# Patient Record
Sex: FEMALE | Race: OTHER | HISPANIC OR LATINO | URBAN - METROPOLITAN AREA
[De-identification: names, ages, dates, MRNs, and addresses within clinical notes are randomized per-mention and may not be internally consistent; named-entity substitution may affect disease eponyms.]

---

## 2020-12-14 ENCOUNTER — INPATIENT (INPATIENT)
Facility: HOSPITAL | Age: 35
LOS: 3 days | Discharge: HOME | End: 2020-12-18
Attending: INTERNAL MEDICINE | Admitting: INTERNAL MEDICINE
Payer: SELF-PAY

## 2020-12-14 VITALS
OXYGEN SATURATION: 96 % | SYSTOLIC BLOOD PRESSURE: 115 MMHG | DIASTOLIC BLOOD PRESSURE: 113 MMHG | RESPIRATION RATE: 16 BRPM | HEART RATE: 93 BPM | TEMPERATURE: 98 F | HEIGHT: 63 IN

## 2020-12-14 PROCEDURE — 99285 EMERGENCY DEPT VISIT HI MDM: CPT

## 2020-12-14 NOTE — ED ADULT TRIAGE NOTE - CHIEF COMPLAINT QUOTE
Pt. brought to [facility] ED by LILLI. Pt. was getting out of bed this morning and felt pain in back. Pt. is unable to sit down. Lying prone on stretcher. Took muscle relaxer(does not know name) at around 1330 pm. NAD noted.

## 2020-12-15 DIAGNOSIS — M54.5 LOW BACK PAIN: ICD-10-CM

## 2020-12-15 DIAGNOSIS — Z29.9 ENCOUNTER FOR PROPHYLACTIC MEASURES, UNSPECIFIED: ICD-10-CM

## 2020-12-15 DIAGNOSIS — R52 PAIN, UNSPECIFIED: ICD-10-CM

## 2020-12-15 DIAGNOSIS — M54.42 LUMBAGO WITH SCIATICA, LEFT SIDE: ICD-10-CM

## 2020-12-15 DIAGNOSIS — Z90.79 ACQUIRED ABSENCE OF OTHER GENITAL ORGAN(S): Chronic | ICD-10-CM

## 2020-12-15 LAB
ALBUMIN SERPL ELPH-MCNC: 3.8 G/DL — SIGNIFICANT CHANGE UP (ref 3.3–5)
ALP SERPL-CCNC: 84 U/L — SIGNIFICANT CHANGE UP (ref 40–120)
ALT FLD-CCNC: 14 U/L — SIGNIFICANT CHANGE UP (ref 4–33)
ANION GAP SERPL CALC-SCNC: 9 MMOL/L — SIGNIFICANT CHANGE UP (ref 7–14)
AST SERPL-CCNC: 20 U/L — SIGNIFICANT CHANGE UP (ref 4–32)
BASOPHILS # BLD AUTO: 0.04 K/UL — SIGNIFICANT CHANGE UP (ref 0–0.2)
BASOPHILS NFR BLD AUTO: 0.5 % — SIGNIFICANT CHANGE UP (ref 0–2)
BILIRUB SERPL-MCNC: 0.3 MG/DL — SIGNIFICANT CHANGE UP (ref 0.2–1.2)
BUN SERPL-MCNC: 14 MG/DL — SIGNIFICANT CHANGE UP (ref 7–23)
CALCIUM SERPL-MCNC: 9 MG/DL — SIGNIFICANT CHANGE UP (ref 8.4–10.5)
CHLORIDE SERPL-SCNC: 102 MMOL/L — SIGNIFICANT CHANGE UP (ref 98–107)
CO2 SERPL-SCNC: 28 MMOL/L — SIGNIFICANT CHANGE UP (ref 22–31)
CREAT SERPL-MCNC: 0.76 MG/DL — SIGNIFICANT CHANGE UP (ref 0.5–1.3)
CRP SERPL-MCNC: <4 MG/L — SIGNIFICANT CHANGE UP
EOSINOPHIL # BLD AUTO: 0.19 K/UL — SIGNIFICANT CHANGE UP (ref 0–0.5)
EOSINOPHIL NFR BLD AUTO: 2.1 % — SIGNIFICANT CHANGE UP (ref 0–6)
ERYTHROCYTE [SEDIMENTATION RATE] IN BLOOD: 18 MM/HR — SIGNIFICANT CHANGE UP (ref 4–25)
GLUCOSE SERPL-MCNC: 95 MG/DL — SIGNIFICANT CHANGE UP (ref 70–99)
HCG SERPL-ACNC: <5 MIU/ML — SIGNIFICANT CHANGE UP
HCT VFR BLD CALC: 39.7 % — SIGNIFICANT CHANGE UP (ref 34.5–45)
HGB BLD-MCNC: 13 G/DL — SIGNIFICANT CHANGE UP (ref 11.5–15.5)
IANC: 4.67 K/UL — SIGNIFICANT CHANGE UP (ref 1.5–8.5)
IMM GRANULOCYTES NFR BLD AUTO: 0.2 % — SIGNIFICANT CHANGE UP (ref 0–1.5)
LYMPHOCYTES # BLD AUTO: 3.32 K/UL — HIGH (ref 1–3.3)
LYMPHOCYTES # BLD AUTO: 37.6 % — SIGNIFICANT CHANGE UP (ref 13–44)
MCHC RBC-ENTMCNC: 29.1 PG — SIGNIFICANT CHANGE UP (ref 27–34)
MCHC RBC-ENTMCNC: 32.7 GM/DL — SIGNIFICANT CHANGE UP (ref 32–36)
MCV RBC AUTO: 89 FL — SIGNIFICANT CHANGE UP (ref 80–100)
MONOCYTES # BLD AUTO: 0.6 K/UL — SIGNIFICANT CHANGE UP (ref 0–0.9)
MONOCYTES NFR BLD AUTO: 6.8 % — SIGNIFICANT CHANGE UP (ref 2–14)
NEUTROPHILS # BLD AUTO: 4.67 K/UL — SIGNIFICANT CHANGE UP (ref 1.8–7.4)
NEUTROPHILS NFR BLD AUTO: 52.8 % — SIGNIFICANT CHANGE UP (ref 43–77)
NRBC # BLD: 0 /100 WBCS — SIGNIFICANT CHANGE UP
NRBC # FLD: 0 K/UL — SIGNIFICANT CHANGE UP
PLATELET # BLD AUTO: 276 K/UL — SIGNIFICANT CHANGE UP (ref 150–400)
POTASSIUM SERPL-MCNC: 3.5 MMOL/L — SIGNIFICANT CHANGE UP (ref 3.5–5.3)
POTASSIUM SERPL-SCNC: 3.5 MMOL/L — SIGNIFICANT CHANGE UP (ref 3.5–5.3)
PROT SERPL-MCNC: 7.1 G/DL — SIGNIFICANT CHANGE UP (ref 6–8.3)
RBC # BLD: 4.46 M/UL — SIGNIFICANT CHANGE UP (ref 3.8–5.2)
RBC # FLD: 11.8 % — SIGNIFICANT CHANGE UP (ref 10.3–14.5)
SARS-COV-2 RNA SPEC QL NAA+PROBE: SIGNIFICANT CHANGE UP
SODIUM SERPL-SCNC: 139 MMOL/L — SIGNIFICANT CHANGE UP (ref 135–145)
WBC # BLD: 8.84 K/UL — SIGNIFICANT CHANGE UP (ref 3.8–10.5)
WBC # FLD AUTO: 8.84 K/UL — SIGNIFICANT CHANGE UP (ref 3.8–10.5)

## 2020-12-15 PROCEDURE — 99223 1ST HOSP IP/OBS HIGH 75: CPT

## 2020-12-15 RX ORDER — LIDOCAINE 4 G/100G
1 CREAM TOPICAL DAILY
Refills: 0 | Status: DISCONTINUED | OUTPATIENT
Start: 2020-12-15 | End: 2020-12-18

## 2020-12-15 RX ORDER — ACETAMINOPHEN 500 MG
975 TABLET ORAL EVERY 8 HOURS
Refills: 0 | Status: DISCONTINUED | OUTPATIENT
Start: 2020-12-15 | End: 2020-12-17

## 2020-12-15 RX ORDER — POLYETHYLENE GLYCOL 3350 17 G/17G
17 POWDER, FOR SOLUTION ORAL DAILY
Refills: 0 | Status: DISCONTINUED | OUTPATIENT
Start: 2020-12-15 | End: 2020-12-18

## 2020-12-15 RX ORDER — MORPHINE SULFATE 50 MG/1
4 CAPSULE, EXTENDED RELEASE ORAL ONCE
Refills: 0 | Status: DISCONTINUED | OUTPATIENT
Start: 2020-12-15 | End: 2020-12-15

## 2020-12-15 RX ORDER — TRAMADOL HYDROCHLORIDE 50 MG/1
50 TABLET ORAL EVERY 6 HOURS
Refills: 0 | Status: DISCONTINUED | OUTPATIENT
Start: 2020-12-15 | End: 2020-12-17

## 2020-12-15 RX ORDER — GABAPENTIN 400 MG/1
100 CAPSULE ORAL THREE TIMES A DAY
Refills: 0 | Status: DISCONTINUED | OUTPATIENT
Start: 2020-12-15 | End: 2020-12-17

## 2020-12-15 RX ORDER — DIAZEPAM 5 MG
5 TABLET ORAL ONCE
Refills: 0 | Status: DISCONTINUED | OUTPATIENT
Start: 2020-12-15 | End: 2020-12-15

## 2020-12-15 RX ORDER — KETOROLAC TROMETHAMINE 30 MG/ML
15 SYRINGE (ML) INJECTION ONCE
Refills: 0 | Status: DISCONTINUED | OUTPATIENT
Start: 2020-12-15 | End: 2020-12-15

## 2020-12-15 RX ORDER — ACETAMINOPHEN 500 MG
975 TABLET ORAL ONCE
Refills: 0 | Status: COMPLETED | OUTPATIENT
Start: 2020-12-15 | End: 2020-12-15

## 2020-12-15 RX ORDER — TRAMADOL HYDROCHLORIDE 50 MG/1
25 TABLET ORAL EVERY 6 HOURS
Refills: 0 | Status: DISCONTINUED | OUTPATIENT
Start: 2020-12-15 | End: 2020-12-17

## 2020-12-15 RX ORDER — MORPHINE SULFATE 50 MG/1
4 CAPSULE, EXTENDED RELEASE ORAL EVERY 4 HOURS
Refills: 0 | Status: DISCONTINUED | OUTPATIENT
Start: 2020-12-15 | End: 2020-12-17

## 2020-12-15 RX ORDER — LIDOCAINE 4 G/100G
1 CREAM TOPICAL ONCE
Refills: 0 | Status: COMPLETED | OUTPATIENT
Start: 2020-12-15 | End: 2020-12-15

## 2020-12-15 RX ORDER — IBUPROFEN 200 MG
600 TABLET ORAL EVERY 6 HOURS
Refills: 0 | Status: DISCONTINUED | OUTPATIENT
Start: 2020-12-15 | End: 2020-12-17

## 2020-12-15 RX ADMIN — Medication 5 MILLIGRAM(S): at 01:48

## 2020-12-15 RX ADMIN — Medication 975 MILLIGRAM(S): at 14:05

## 2020-12-15 RX ADMIN — Medication 600 MILLIGRAM(S): at 18:03

## 2020-12-15 RX ADMIN — MORPHINE SULFATE 4 MILLIGRAM(S): 50 CAPSULE, EXTENDED RELEASE ORAL at 00:44

## 2020-12-15 RX ADMIN — LIDOCAINE 1 PATCH: 4 CREAM TOPICAL at 23:21

## 2020-12-15 RX ADMIN — LIDOCAINE 1 PATCH: 4 CREAM TOPICAL at 05:50

## 2020-12-15 RX ADMIN — LIDOCAINE 1 PATCH: 4 CREAM TOPICAL at 05:45

## 2020-12-15 RX ADMIN — LIDOCAINE 1 PATCH: 4 CREAM TOPICAL at 11:12

## 2020-12-15 RX ADMIN — LIDOCAINE 1 PATCH: 4 CREAM TOPICAL at 11:17

## 2020-12-15 RX ADMIN — GABAPENTIN 100 MILLIGRAM(S): 400 CAPSULE ORAL at 21:27

## 2020-12-15 RX ADMIN — LIDOCAINE 1 PATCH: 4 CREAM TOPICAL at 00:44

## 2020-12-15 RX ADMIN — Medication 600 MILLIGRAM(S): at 11:57

## 2020-12-15 RX ADMIN — MORPHINE SULFATE 4 MILLIGRAM(S): 50 CAPSULE, EXTENDED RELEASE ORAL at 18:51

## 2020-12-15 RX ADMIN — Medication 975 MILLIGRAM(S): at 15:00

## 2020-12-15 RX ADMIN — Medication 600 MILLIGRAM(S): at 18:33

## 2020-12-15 RX ADMIN — MORPHINE SULFATE 4 MILLIGRAM(S): 50 CAPSULE, EXTENDED RELEASE ORAL at 05:46

## 2020-12-15 RX ADMIN — LIDOCAINE 1 PATCH: 4 CREAM TOPICAL at 18:55

## 2020-12-15 RX ADMIN — MORPHINE SULFATE 4 MILLIGRAM(S): 50 CAPSULE, EXTENDED RELEASE ORAL at 05:35

## 2020-12-15 RX ADMIN — Medication 600 MILLIGRAM(S): at 23:21

## 2020-12-15 RX ADMIN — GABAPENTIN 100 MILLIGRAM(S): 400 CAPSULE ORAL at 14:05

## 2020-12-15 RX ADMIN — Medication 975 MILLIGRAM(S): at 00:43

## 2020-12-15 RX ADMIN — Medication 975 MILLIGRAM(S): at 21:27

## 2020-12-15 RX ADMIN — Medication 15 MILLIGRAM(S): at 01:48

## 2020-12-15 RX ADMIN — Medication 600 MILLIGRAM(S): at 11:11

## 2020-12-15 NOTE — H&P ADULT - NSHPSOCIALHISTORY_GEN_ALL_CORE
Lives at home with  and children  Works remotely for customer services for [employer]  Denies smoking, alcohol use or illicit drugs/IVDU

## 2020-12-15 NOTE — ED PROVIDER NOTE - ATTENDING CONTRIBUTION TO CARE
I have seen and examined the patient on the patient´s visit date. I have reviewed the note written by Otis Camacho DO on that visit day. I have supervised and participated as necessary in the performance of procedures indicated for patient management and was available at all phases of the patient´s visit when needed. We discussed the history, physical exam findings, management plan, and  medical decision making. I have made my additions, exceptions, and revisions within the chart and I agree with H and P as documented in its entirety. The data and my interpretation of any data collected from labs, interventions and imaging appear below as well as my independent medical decision making and considerations    The patient is a 35y Female who has a past medical and surgery history of Ectopic pregnancy PTED with right lower back pain that has gotten worse over the past week and unresponsive to conservative measures   Vital Signs Last 24 Hrs  T(C): 36.9 (14 Dec 2020 22:23), Max: 36.9 (14 Dec 2020 22:23)  T(F): 98.4 (14 Dec 2020 22:23), Max: 98.4 (14 Dec 2020 22:23)  HR: 93 (14 Dec 2020 22:23) (93 - 93)  BP: 115/113 (14 Dec 2020 22:23) (115/113 - 115/113)  BP(mean): --  RR: 16 (14 Dec 2020 22:23) (16 - 16)  SpO2: 96% (14 Dec 2020 22:23) (96% - 96%)Height (cm): 160 (12-14-20 @ 22:23)  PE: as described; my additions and exceptions are noted in the chart  IMP: MSK back pain  Plan  analgesics=toradol/morphine; lidocaine patch reassess I have seen and examined the patient on the patient´s visit date. I have reviewed the note written by Otis Camacho DO on that visit day. I have supervised and participated as necessary in the performance of procedures indicated for patient management and was available at all phases of the patient´s visit when needed. We discussed the history, physical exam findings, management plan, and  medical decision making. I have made my additions, exceptions, and revisions within the chart and I agree with H and P as documented in its entirety. The data and my interpretation of any data collected from labs, interventions and imaging appear below as well as my independent medical decision making and considerations    The patient is a 35y Female who has a past medical and surgery history of Ectopic pregnancy PTED with right lower back pain that has gotten worse over the past week and unresponsive to conservative measures   Vital Signs Last 24 Hrs  T(C): 36.9 (14 Dec 2020 22:23), Max: 36.9 (14 Dec 2020 22:23)  T(F): 98.4 (14 Dec 2020 22:23), Max: 98.4 (14 Dec 2020 22:23)  HR: 93 (14 Dec 2020 22:23) (93 - 93)  BP: 115/113 (14 Dec 2020 22:23) (115/113 - 115/113)  BP(mean): --  RR: 16 (14 Dec 2020 22:23) (16 - 16)  SpO2: 96% (14 Dec 2020 22:23) (96% - 96%)Height (cm): 160 (12-14-20 @ 22:23)  PE: as described; my additions and exceptions are noted in the chart  IMP: MSK back pain  Plan  analgesics=toradol/morphine; lidocaine patch   Reassess  Dispo and further workup predicated on response

## 2020-12-15 NOTE — ED PROVIDER NOTE - PHYSICAL EXAMINATION
General: alert, conversant, looks well/non toxic, vitals reassuring, appears uncomfortable. lying flat   Head: atraumatic, normocephalic  Eyes: PERRL, EOMI, no scleral icterus  ENT: no epistaxis, moist mucous membranes, normal phonation, airway patent  Neck: full ROM, no midline ttp  CV: RRR, no murmurs, HDS  Pulm: lungs CTA b/l, no wheezing, no respiratory distress  GI: abd soft, non tender, no guarding/rebound/masses  Back: limited ROM 2/2 to pain, comfortable lying supine, - SLR b/l, no ecchymosis, no midline ttp, tenderness to paraspinal R lumbar  Extremities: normal ROM, joints stable, distal pulses intact, no edema  Neuro: alert, oriented x3, moving all extremities, interactive, normal speech/memory, 5/5 strength in extremities, no sensory deficits   Derm: warm, dry, normal color, no rash/wounds

## 2020-12-15 NOTE — H&P ADULT - MOTOR
Unable to assess strength in LLE due to pain and spasms, able to wiggle toes; 5/5 strenght in upper extremities

## 2020-12-15 NOTE — ED ADULT NURSE NOTE - OBJECTIVE STATEMENT
Pt is a 35yr old female, A&OX4, complaining of lower back pain that started this morning. Pt reports shes unable to walk or sit up. Currently side lying on the stretcher. Pt denies numbness and tingling, trauma, or falls. States she got out of bed and thinks she pulled something. Pt took muscle relaxer at home (unsure of name) with no relief. Resp. even and unlabored. Denies CP, SOB, HA, dizziness, abd. pain, N/V, fever/chills, cough, and urinary symptoms. VS as noted. 20g IV placed to the L AC. Labs sent. Meds given as per order. NAD. Will continue to monitor.

## 2020-12-15 NOTE — ED PROVIDER NOTE - CARE PLAN
Principal Discharge DX:	Acute right-sided low back pain without sciatica   Principal Discharge DX:	Acute right-sided low back pain without sciatica  Secondary Diagnosis:	Intractable back pain

## 2020-12-15 NOTE — H&P ADULT - PROBLEM SELECTOR PLAN 2
Intractable back pain impairing ambulation, likely MSK etiology  - start standing tylenol  - gabapentin 100 mg TID  - ibuprfen 600 mg q6 hrs  - tramadol 25 mg q6 for moderate and tramadol 50 mg q6 for severe  - morphine 4 mg q4 hrs breakthrough

## 2020-12-15 NOTE — H&P ADULT - PROBLEM SELECTOR PLAN 1
Patient with acute onset back with sciatica like symptoms to the left but otherwise no focal neuro deficits.  - pain control as below  - PT evaluation  - MR T+L spine

## 2020-12-15 NOTE — ED PROVIDER NOTE - OBJECTIVE STATEMENT
35 yoF, healthy BIB EMS for worsening low back pain, worse on R. States has been having pain for 1 week and has been using roller, heat. Was actually trying to make appt with physician today. Became acutely worse this am after moving from lying flat. Now pain so severe unable to walk. Sx worse since lunch time today (Monday). Tried tylenol and then tramadol/muscle relaxer from neighbor with no relief. Unable to walk due to pain. Denies any bowel/urinary sx, fever/IVDA, weakness in BLE, or sensory changes. Denies any urinary sx or abd sx. No recent illness. Never had back pain this severe before. No prior back surgeries. Denies being pregnant. No smoking or etoh use.

## 2020-12-15 NOTE — H&P ADULT - HISTORY OF PRESENT ILLNESS
35F no PMH presenting with acute back pain over the last week. Patient endorses she has been working from home and largely sedentary. Last week patient endorses gradual onset low back pain, which had been treating conservatively with hot packs. One day prior to admission, patient endorses severe worsening of back pain while trying to get out of bed. The pain was so severe she was unable to ambulate to the bathroom. Endorses pain radiates from the midback down the LLE assoaciated with some numbness and tingling. Denies any fevers, chills, headaches, difficulty urinating/defecating.      Here in ER, afebrile VS wnl. Received morphine 4 mg x 2, lidoderm patch, toradol 15 mg x 1, diazepam 5 mg x 1, and tylenol 975 with mininal relief. Admitted for pain control.

## 2020-12-15 NOTE — H&P ADULT - ASSESSMENT
35F no PMH presenting with acute back pain over the last week with significant pain impairing ambulation but no focal deficits, concerning for muscular strain verses disc hernaition

## 2020-12-15 NOTE — ED PROVIDER NOTE - CLINICAL SUMMARY MEDICAL DECISION MAKING FREE TEXT BOX
Doug, PGY3- 35 yoF, healthy BIB EMS for worsening low back pain, worse on R. States has been having pain for 1 week and has been using roller, heat. Was actually trying to make appt with physician today. Became acutely worse this am after moving from lying flat. Now pain so severe unable to walk. Sx worse since lunch time today (Monday). Tried tylenol and then tramadol/muscle relaxer from neighbor with no relief. Unable to walk due to pain. Denies any bowel/urinary sx, fever/IVDA, weakness in BLE, or sensory changes. Denies any urinary sx or abd sx. No recent illness. Never had back pain this severe before. No prior back surgeries. Denies being pregnant. No smoking or etoh use.  looks uncomfortable but non toxic, vitals reassuring, normal neuro exam, abd soft, no midline ttp on vertebral exam, able to roll over on own power  likely msk strain/spasm, no red flags for central cord pathology, acute worsening of sub acute issue, not consistent with /abd/pelvic/cardiac/aortic pathology  parenteral analgesia given inability to ambulate 2/2 to pain, no imaging indicated   re-eval after meds

## 2020-12-15 NOTE — H&P ADULT - NSHPPHYSICALEXAM_GEN_ALL_CORE
Vital Signs Last 24 Hrs  T(C): 36.6 (15 Dec 2020 08:52), Max: 36.9 (14 Dec 2020 22:23)  T(F): 97.9 (15 Dec 2020 08:52), Max: 98.4 (14 Dec 2020 22:23)  HR: 77 (15 Dec 2020 08:52) (77 - 93)  BP: 126/74 (15 Dec 2020 08:52) (115/113 - 136/89)  BP(mean): --  RR: 18 (15 Dec 2020 08:52) (16 - 18)  SpO2: 99% (15 Dec 2020 08:52) (96% - 100%)

## 2020-12-15 NOTE — ED PROVIDER NOTE - PROGRESS NOTE DETAILS
Doug, PGY3- Sleeping in ED after medications. Upon awakening for re-eval: pain improved but still very uncomfortable with trying to roll or stand. Not able to stand even with assistance. Re-examined: no midline lumbar ttp. 5/5 strength with plantarflexion/dorsiflexion. No sensory deficits in lower extremities/saddle region.  Will rpt morphine and admit for intractable back pain. Dr. Boone accepting.

## 2020-12-16 LAB
ANION GAP SERPL CALC-SCNC: 10 MMOL/L — SIGNIFICANT CHANGE UP (ref 7–14)
BASOPHILS # BLD AUTO: 0.02 K/UL — SIGNIFICANT CHANGE UP (ref 0–0.2)
BASOPHILS NFR BLD AUTO: 0.3 % — SIGNIFICANT CHANGE UP (ref 0–2)
BUN SERPL-MCNC: 12 MG/DL — SIGNIFICANT CHANGE UP (ref 7–23)
CALCIUM SERPL-MCNC: 8.9 MG/DL — SIGNIFICANT CHANGE UP (ref 8.4–10.5)
CHLORIDE SERPL-SCNC: 104 MMOL/L — SIGNIFICANT CHANGE UP (ref 98–107)
CO2 SERPL-SCNC: 24 MMOL/L — SIGNIFICANT CHANGE UP (ref 22–31)
CREAT SERPL-MCNC: 0.63 MG/DL — SIGNIFICANT CHANGE UP (ref 0.5–1.3)
EOSINOPHIL # BLD AUTO: 0.21 K/UL — SIGNIFICANT CHANGE UP (ref 0–0.5)
EOSINOPHIL NFR BLD AUTO: 3.1 % — SIGNIFICANT CHANGE UP (ref 0–6)
GLUCOSE SERPL-MCNC: 84 MG/DL — SIGNIFICANT CHANGE UP (ref 70–99)
HCT VFR BLD CALC: 41.7 % — SIGNIFICANT CHANGE UP (ref 34.5–45)
HGB BLD-MCNC: 13.7 G/DL — SIGNIFICANT CHANGE UP (ref 11.5–15.5)
IANC: 3.87 K/UL — SIGNIFICANT CHANGE UP (ref 1.5–8.5)
IMM GRANULOCYTES NFR BLD AUTO: 0.6 % — SIGNIFICANT CHANGE UP (ref 0–1.5)
LYMPHOCYTES # BLD AUTO: 2.18 K/UL — SIGNIFICANT CHANGE UP (ref 1–3.3)
LYMPHOCYTES # BLD AUTO: 32.5 % — SIGNIFICANT CHANGE UP (ref 13–44)
MCHC RBC-ENTMCNC: 29.5 PG — SIGNIFICANT CHANGE UP (ref 27–34)
MCHC RBC-ENTMCNC: 32.9 GM/DL — SIGNIFICANT CHANGE UP (ref 32–36)
MCV RBC AUTO: 89.7 FL — SIGNIFICANT CHANGE UP (ref 80–100)
MONOCYTES # BLD AUTO: 0.39 K/UL — SIGNIFICANT CHANGE UP (ref 0–0.9)
MONOCYTES NFR BLD AUTO: 5.8 % — SIGNIFICANT CHANGE UP (ref 2–14)
NEUTROPHILS # BLD AUTO: 3.87 K/UL — SIGNIFICANT CHANGE UP (ref 1.8–7.4)
NEUTROPHILS NFR BLD AUTO: 57.7 % — SIGNIFICANT CHANGE UP (ref 43–77)
NRBC # BLD: 0 /100 WBCS — SIGNIFICANT CHANGE UP
NRBC # FLD: 0 K/UL — SIGNIFICANT CHANGE UP
PLATELET # BLD AUTO: 235 K/UL — SIGNIFICANT CHANGE UP (ref 150–400)
POTASSIUM SERPL-MCNC: 3.9 MMOL/L — SIGNIFICANT CHANGE UP (ref 3.5–5.3)
POTASSIUM SERPL-SCNC: 3.9 MMOL/L — SIGNIFICANT CHANGE UP (ref 3.5–5.3)
RBC # BLD: 4.65 M/UL — SIGNIFICANT CHANGE UP (ref 3.8–5.2)
RBC # FLD: 11.8 % — SIGNIFICANT CHANGE UP (ref 10.3–14.5)
SODIUM SERPL-SCNC: 138 MMOL/L — SIGNIFICANT CHANGE UP (ref 135–145)
WBC # BLD: 6.71 K/UL — SIGNIFICANT CHANGE UP (ref 3.8–10.5)
WBC # FLD AUTO: 6.71 K/UL — SIGNIFICANT CHANGE UP (ref 3.8–10.5)

## 2020-12-16 PROCEDURE — 72148 MRI LUMBAR SPINE W/O DYE: CPT | Mod: 26

## 2020-12-16 PROCEDURE — 72146 MRI CHEST SPINE W/O DYE: CPT | Mod: 26

## 2020-12-16 RX ORDER — INFLUENZA VIRUS VACCINE 15; 15; 15; 15 UG/.5ML; UG/.5ML; UG/.5ML; UG/.5ML
0.5 SUSPENSION INTRAMUSCULAR ONCE
Refills: 0 | Status: COMPLETED | OUTPATIENT
Start: 2020-12-16 | End: 2020-12-16

## 2020-12-16 RX ADMIN — TRAMADOL HYDROCHLORIDE 50 MILLIGRAM(S): 50 TABLET ORAL at 14:00

## 2020-12-16 RX ADMIN — Medication 600 MILLIGRAM(S): at 06:12

## 2020-12-16 RX ADMIN — Medication 600 MILLIGRAM(S): at 17:11

## 2020-12-16 RX ADMIN — TRAMADOL HYDROCHLORIDE 50 MILLIGRAM(S): 50 TABLET ORAL at 13:12

## 2020-12-16 RX ADMIN — TRAMADOL HYDROCHLORIDE 50 MILLIGRAM(S): 50 TABLET ORAL at 23:39

## 2020-12-16 RX ADMIN — Medication 975 MILLIGRAM(S): at 22:15

## 2020-12-16 RX ADMIN — LIDOCAINE 1 PATCH: 4 CREAM TOPICAL at 18:41

## 2020-12-16 RX ADMIN — LIDOCAINE 1 PATCH: 4 CREAM TOPICAL at 12:05

## 2020-12-16 RX ADMIN — MORPHINE SULFATE 4 MILLIGRAM(S): 50 CAPSULE, EXTENDED RELEASE ORAL at 19:05

## 2020-12-16 RX ADMIN — MORPHINE SULFATE 4 MILLIGRAM(S): 50 CAPSULE, EXTENDED RELEASE ORAL at 09:05

## 2020-12-16 RX ADMIN — Medication 975 MILLIGRAM(S): at 06:12

## 2020-12-16 RX ADMIN — GABAPENTIN 100 MILLIGRAM(S): 400 CAPSULE ORAL at 06:12

## 2020-12-16 RX ADMIN — MORPHINE SULFATE 4 MILLIGRAM(S): 50 CAPSULE, EXTENDED RELEASE ORAL at 18:51

## 2020-12-16 RX ADMIN — GABAPENTIN 100 MILLIGRAM(S): 400 CAPSULE ORAL at 12:04

## 2020-12-16 RX ADMIN — GABAPENTIN 100 MILLIGRAM(S): 400 CAPSULE ORAL at 22:15

## 2020-12-16 RX ADMIN — Medication 600 MILLIGRAM(S): at 12:04

## 2020-12-16 NOTE — PHYSICAL THERAPY INITIAL EVALUATION ADULT - PATIENT PROFILE REVIEW, REHAB EVAL
PT orders received: ambulate with assistance. Consult with CHANTAL Sierra, pt may participate in PT evaluation.

## 2020-12-16 NOTE — PHYSICAL THERAPY INITIAL EVALUATION ADULT - ASSISTIVE DEVICE FOR TRANSFER: GAIT, REHAB EVAL
Without rolling walker, pt reports being prepared to ambulate back to bed holding onto nearby furniture as support./rolling walker rolling walker

## 2020-12-16 NOTE — PHYSICAL THERAPY INITIAL EVALUATION ADULT - ADDITIONAL COMMENTS
Patient lived in a private house with family, requiring 4 steps to negotiate. Per pt, there are 8 steps required to reach her bedroom. Patient was ambulating independently without an assistive device. Prior to admission, patient was independent across all transfer and ADL skills.    Patient was left semi-supine in bed,. Call bell within reach. CHANTAL iqbal.

## 2020-12-16 NOTE — PHYSICAL THERAPY INITIAL EVALUATION ADULT - GENERAL OBSERVATIONS, REHAB EVAL
Patient was received semi-supine in patient transport stretcher, with 9/10 pain in back, but was willing to participate in PT session. CHANTAL iqbal.

## 2020-12-16 NOTE — PHYSICAL THERAPY INITIAL EVALUATION ADULT - PERTINENT HX OF CURRENT PROBLEM, REHAB EVAL
Pt is a 34 yo female p/w acute back pain x1 week. Pt has been working from home and largely sedentary. Initially, low back pain pain has been gradually increasing, until one day prior to admission, patient endorses severe worsening of back pain while trying to get out of bed. Subsequently, pt was unable to ambulate to the bathroom. Pt reports pain radiates from the midback down the LLE assoaciated with some numbness and tingling. PMH: insignificant

## 2020-12-17 ENCOUNTER — TRANSCRIPTION ENCOUNTER (OUTPATIENT)
Age: 35
End: 2020-12-17

## 2020-12-17 PROCEDURE — 99255 IP/OBS CONSLTJ NEW/EST HI 80: CPT

## 2020-12-17 RX ORDER — ACETAMINOPHEN 500 MG
975 TABLET ORAL EVERY 8 HOURS
Refills: 0 | Status: DISCONTINUED | OUTPATIENT
Start: 2020-12-17 | End: 2020-12-18

## 2020-12-17 RX ORDER — TIZANIDINE 4 MG/1
2 TABLET ORAL EVERY 6 HOURS
Refills: 0 | Status: DISCONTINUED | OUTPATIENT
Start: 2020-12-17 | End: 2020-12-18

## 2020-12-17 RX ORDER — IBUPROFEN 200 MG
600 TABLET ORAL EVERY 6 HOURS
Refills: 0 | Status: DISCONTINUED | OUTPATIENT
Start: 2020-12-17 | End: 2020-12-18

## 2020-12-17 RX ORDER — OXYCODONE HYDROCHLORIDE 5 MG/1
5 TABLET ORAL EVERY 4 HOURS
Refills: 0 | Status: DISCONTINUED | OUTPATIENT
Start: 2020-12-17 | End: 2020-12-18

## 2020-12-17 RX ORDER — OXYCODONE HYDROCHLORIDE 5 MG/1
10 TABLET ORAL EVERY 4 HOURS
Refills: 0 | Status: DISCONTINUED | OUTPATIENT
Start: 2020-12-17 | End: 2020-12-18

## 2020-12-17 RX ORDER — GABAPENTIN 400 MG/1
300 CAPSULE ORAL THREE TIMES A DAY
Refills: 0 | Status: DISCONTINUED | OUTPATIENT
Start: 2020-12-17 | End: 2020-12-18

## 2020-12-17 RX ADMIN — OXYCODONE HYDROCHLORIDE 10 MILLIGRAM(S): 5 TABLET ORAL at 12:35

## 2020-12-17 RX ADMIN — Medication 600 MILLIGRAM(S): at 12:35

## 2020-12-17 RX ADMIN — GABAPENTIN 100 MILLIGRAM(S): 400 CAPSULE ORAL at 05:36

## 2020-12-17 RX ADMIN — TRAMADOL HYDROCHLORIDE 50 MILLIGRAM(S): 50 TABLET ORAL at 00:15

## 2020-12-17 RX ADMIN — GABAPENTIN 300 MILLIGRAM(S): 400 CAPSULE ORAL at 15:08

## 2020-12-17 RX ADMIN — Medication 975 MILLIGRAM(S): at 05:37

## 2020-12-17 RX ADMIN — Medication 40 MILLIGRAM(S): at 17:29

## 2020-12-17 RX ADMIN — LIDOCAINE 1 PATCH: 4 CREAM TOPICAL at 17:30

## 2020-12-17 RX ADMIN — Medication 600 MILLIGRAM(S): at 13:15

## 2020-12-17 RX ADMIN — Medication 975 MILLIGRAM(S): at 15:08

## 2020-12-17 RX ADMIN — GABAPENTIN 300 MILLIGRAM(S): 400 CAPSULE ORAL at 21:28

## 2020-12-17 RX ADMIN — LIDOCAINE 1 PATCH: 4 CREAM TOPICAL at 12:35

## 2020-12-17 RX ADMIN — OXYCODONE HYDROCHLORIDE 10 MILLIGRAM(S): 5 TABLET ORAL at 13:15

## 2020-12-17 RX ADMIN — Medication 600 MILLIGRAM(S): at 05:36

## 2020-12-17 RX ADMIN — Medication 975 MILLIGRAM(S): at 21:28

## 2020-12-17 RX ADMIN — Medication 600 MILLIGRAM(S): at 17:29

## 2020-12-17 RX ADMIN — Medication 600 MILLIGRAM(S): at 21:28

## 2020-12-17 NOTE — CONSULT NOTE ADULT - SUBJECTIVE AND OBJECTIVE BOX
NEUROSURGERY CONSULT    Neurosurgery consulted for findings of herniated discs in thoracic and lumbar spine. Patient has significant pain in back and LLE that causes her discomfort. MRI shows T7-8, L5-S1 HNP. Patient states she feels slight relief from her current pain regimen.      HPI: 35F no PMH presenting with acute back pain over the last week. Patient endorses she has been working from home and largely sedentary. Last week patient endorses gradual onset low back pain, which had been treating conservatively with hot packs. One day prior to admission, patient endorses severe worsening of back pain while trying to get out of bed. The pain was so severe she was unable to ambulate to the bathroom. Endorses pain radiates from the midback down the LLE assoaciated with some numbness and tingling. Denies any fevers, chills, headaches, difficulty urinating/defecating.   (15 Dec 2020 09:43)    RADIOLOGY:   < from: MR Thoracic Spine No Cont (12.16.20 @ 11:33) >    IMPRESSION:    Maintenance of the usual thoracic and lumbar curvature without fracture or listhesis.    At T7-T8, there is a central disc extrusion with mild to moderate narrowing with abutment of the ventral aspect of the cord.    At L5-S1, there is a right central disc protrusion with mild central canal narrowing.      MEDS:  acetaminophen   Tablet .. 975 milliGRAM(s) Oral every 8 hours  gabapentin 300 milliGRAM(s) Oral three times a day  ibuprofen  Tablet. 600 milliGRAM(s) Oral every 6 hours  influenza   Vaccine 0.5 milliLiter(s) IntraMuscular once  lidocaine   Patch 1 Patch Transdermal daily  oxyCODONE    IR 5 milliGRAM(s) Oral every 4 hours PRN  oxyCODONE    IR 10 milliGRAM(s) Oral every 4 hours PRN  polyethylene glycol 3350 17 Gram(s) Oral daily      PHYSICAL EXAM:  Vital Signs Last 24 Hrs  T(C): 36.6 (17 Dec 2020 15:15), Max: 36.8 (16 Dec 2020 22:05)  T(F): 97.9 (17 Dec 2020 15:15), Max: 98.2 (16 Dec 2020 22:05)  HR: 83 (17 Dec 2020 15:15) (83 - 96)  BP: 144/88 (17 Dec 2020 15:15) (127/85 - 144/88)  RR: 17 (17 Dec 2020 15:15) (16 - 18)  SpO2: 100% (17 Dec 2020 15:15) (100% - 100%)    Examined by MARTINEZ dEwards     AOx3, appropriate, follows commands  PERRL, EOMI, face symmetrical   DEE x 4 with good strength - 4+/5 throughout lowers 2/2 pain   Sensation intact to light touch   No pronator drift   No clonus  No hoffmans    LABS:                        13.7   6.71  )-----------( 235      ( 16 Dec 2020 07:58 )             41.7     12-16    138  |  104  |  12  ----------------------------<  84  3.9   |  24  |  0.63    Ca    8.9      16 Dec 2020 07:58                
Chief Complaint:    HPI:  35F no PMH presenting with acute back pain over the last week. Patient endorses she has been working from home and largely sedentary. Last week patient endorses gradual onset low back pain, which had been treating conservatively with hot packs. One day prior to admission, patient endorses severe worsening of back pain while trying to get out of bed. The pain was so severe she was unable to ambulate to the bathroom. Endorses pain radiates from the midback down the LLE assoaciated with some numbness and tingling. Denies any fevers, chills, headaches, difficulty urinating/defecating.      Here in ER, afebrile VS wnl. Received morphine 4 mg x 2, lidoderm patch, toradol 15 mg x 1, diazepam 5 mg x 1, and tylenol 975 with mininal relief. Admitted for pain control.        (15 Dec 2020 09:43)      PAST MEDICAL & SURGICAL HISTORY:  Ectopic pregnancy    History of unilateral fallopian tube excision        FAMILY HISTORY:  Family history of hypertension  mother        SOCIAL HISTORY:  [ ] Denies Smoking, Alcohol, or Drug Use    Allergies    No Known Allergies    Intolerances        PAIN MEDICATIONS:  acetaminophen   Tablet .. 975 milliGRAM(s) Oral every 8 hours  gabapentin 300 milliGRAM(s) Oral three times a day  ibuprofen  Tablet. 600 milliGRAM(s) Oral every 6 hours  oxyCODONE    IR 5 milliGRAM(s) Oral every 4 hours PRN  oxyCODONE    IR 10 milliGRAM(s) Oral every 4 hours PRN      Heme:    Antibiotics:    Cardiovascular:    GI:  polyethylene glycol 3350 17 Gram(s) Oral daily    Endocrine:    All Other Medications:  influenza   Vaccine 0.5 milliLiter(s) IntraMuscular once  lidocaine   Patch 1 Patch Transdermal daily      REVIEW OF SYSTEMS:    CONSTITUTIONAL: No fever, weight loss, or fatigue  EYES: No eye pain, visual disturbances, or discharge  ENMT:  No difficulty hearing, tinnitus, vertigo; No sinus or throat pain  NECK: No pain or stiffness  BREASTS: No pain, masses, or nipple discharge  RESPIRATORY: No cough, wheezing, chills or hemoptysis; No shortness of breath  CARDIOVASCULAR: No chest pain, palpitations, dizziness, or leg swelling  GASTROINTESTINAL: No abdominal or epigastric pain. No nausea, vomiting, or hematemesis; No diarrhea or constipation. No melena or hematochezia.  GENITOURINARY: No dysuria, frequency, hematuria, or incontinence  NEUROLOGICAL: No headaches, memory loss, loss of strength, numbness, or tremors  SKIN: No itching, burning, rashes, or lesions   LYMPH NODES: No enlarged glands  ENDOCRINE: No heat or cold intolerance; No hair loss  MUSCULOSKELETAL: No joint pain or swelling; No muscle, back, or extremity pain  PSYCHIATRIC: No depression, anxiety, mood swings, or difficulty sleeping  HEME/LYMPH: No easy bruising, or bleeding gums  ALLERY AND IMMUNOLOGIC: No hives or eczema      Vital Signs Last 24 Hrs  T(C): 36.6 (17 Dec 2020 15:15), Max: 36.8 (16 Dec 2020 22:05)  T(F): 97.9 (17 Dec 2020 15:15), Max: 98.2 (16 Dec 2020 22:05)  HR: 83 (17 Dec 2020 15:15) (83 - 96)  BP: 144/88 (17 Dec 2020 15:15) (127/85 - 144/88)  BP(mean): --  RR: 17 (17 Dec 2020 15:15) (16 - 18)  SpO2: 100% (17 Dec 2020 15:15) (100% - 100%)    PAIN SCORE:         SCALE USED: (1-10 VNRS)             PHYSICAL EXAM:    GENERAL: NAD, well-groomed, well-developed  HEAD:  Atraumatic, Normocephalic  EYES: EOMI, PERRLA, conjunctiva and sclera clear  ENMT: No tonsillar erythema, exudates, or enlargement; Moist mucous membranes, Good dentition, No lesions  NECK: Supple, No JVD, Normal thyroid  NERVOUS SYSTEM:  Alert & Oriented X3, Good concentration; Motor Strength 5/5 B/L upper and lower extremities; DTRs 2+ intact and symmetric  CHEST/LUNG: Clear to percussion bilaterally; No rales, rhonchi, wheezing, or rubs  HEART: Regular rate and rhythm; No murmurs, rubs, or gallops  ABDOMEN: Soft, Nontender, Nondistended; Bowel sounds present  EXTREMITIES:  2+ Peripheral Pulses, No clubbing, cyanosis, or edema  LYMPH: No lymphadenopathy noted  SKIN: No rashes or lesions        LABS:                          13.7   6.71  )-----------( 235      ( 16 Dec 2020 07:58 )             41.7     12-16    138  |  104  |  12  ----------------------------<  84  3.9   |  24  |  0.63    Ca    8.9      16 Dec 2020 07:58      Comments: Pt. complaining of pain in lower back 7/10, describes it as a contraction in her lower back. States the morphine Iv took the edge off but doesn't think anything else is helping with her pain. Patient A&Ox3, NAD, laying in bed answers all questions appropriately.     RECOMMENDATIONS  1. Add oxycodone 5mg for moderate pain and 10mg for severe pain q4hrs PRN  2. Increase gabapentin to 300mg q8hrs   3. Continue Tylenol and Nsaid as ordered   4. Consider Tizanidine 2mg q6hrs PRN for muscle spasm   5. Add lidoderm patch to lower back   6. Have patient follow up with chronic pain MD as outpatient     Will discuss with chronic pain attending      [X ]  NIKO  Reviewed and Copied to Chart

## 2020-12-17 NOTE — PROGRESS NOTE ADULT - ASSESSMENT
35F no PMH presenting with acute back pain over the last week with significant pain impairing ambulation but no focal deficits, concerning for muscular strain verses disc hernaition       Problem/Plan - 1:  ·  Problem: Acute back pain with sciatica, left.  Plan: Patient with acute onset back with sciatica like symptoms to the left but otherwise no focal neuro deficits.  - pain control as below  - PT evaluation  -  MR Thoracic Spine No Cont (12.16.20 @ 11:33) >  IMPRESSION:    Maintenance of the usual thoracic and lumbar curvature without fracture or listhesis.    At T7-T8, there is a central disc extrusion with mild to moderate narrowing with abutment of the ventral aspect of the cord.    At L5-S1, there is a right central disc protrusion with mild central canal narrowing.          Awaiting Neurosurgery input.      Problem/Plan - 2:  ·  Problem: Pain management.  Plan: Intractable back pain impairing ambulation, likely MSK etiology  - start standing tylenol  - gabapentin 100 mg TID  - ibuprfen 600 mg q6 hrs  - tramadol 25 mg q6 for moderate and tramadol 50 mg q6 for severe  - morphine 4 mg q4 hrs breakthrough.      Problem/Plan - 3:  ·  Problem: Need for prophylactic measure.  Plan: .  DVT: low risk  Diet: DASH TLC CC  PT.       
35F no PMH presenting with acute back pain over the last week with significant pain impairing ambulation but no focal deficits, concerning for muscular strain verses disc hernaition       Problem/Plan - 1:  ·  Problem: Acute back pain with sciatica, left.  Plan: Patient with acute onset back with sciatica like symptoms to the left but otherwise no focal neuro deficits.  - pain control as below  - PT evaluation  -  MR Thoracic Spine No Cont (12.16.20 @ 11:33) >  IMPRESSION:    Maintenance of the usual thoracic and lumbar curvature without fracture or listhesis.     T7-T8, there is a central disc extrusion with mild to moderate narrowing with abutment of the ventral aspect of the cord.    At L5-S1, there is a right central disc protrusion with mild central canal narrowing.    Neurosurgery consulted and outpt follow up.      Problem/Plan - 2:  ·  Problem: Pain management.  Plan: Intractable back pain impairing ambulation, likely MSK etiology  - start standing tylenol  - gabapentin 100 mg TID  - ibuprfen 600 mg q6 hrs  - tramadol 25 mg q6 for moderate and tramadol 50 mg q6 for severe  - morphine 4 mg q4 hrs breakthrough.   - Pain management consult.     Problem/Plan - 3:  ·  Problem: Need for prophylactic measure.  Plan: .  DVT: low risk  Diet: DASH TLC CC  PT.

## 2020-12-17 NOTE — CONSULT NOTE ADULT - ASSESSMENT
36yo female with T7-8. L5-S1 HNP    PLAN:   - can be discharged when pain controlled on medrol dose [place]  - PT   - f/u OP with Dr. Juan Norris     Case discussed with attending neurosurgeon.

## 2020-12-17 NOTE — PROGRESS NOTE ADULT - SUBJECTIVE AND OBJECTIVE BOX
INTERVAL HPI/OVERNIGHT EVENTS: I feel fine.   Vital Signs Last 24 Hrs  T(C): 36.6 (17 Dec 2020 15:15), Max: 36.8 (16 Dec 2020 22:05)  T(F): 97.9 (17 Dec 2020 15:15), Max: 98.2 (16 Dec 2020 22:05)  HR: 83 (17 Dec 2020 15:15) (83 - 96)  BP: 144/88 (17 Dec 2020 15:15) (127/85 - 144/88)  BP(mean): --  RR: 17 (17 Dec 2020 15:15) (16 - 17)  SpO2: 100% (17 Dec 2020 15:15) (100% - 100%)  I&O's Summary    MEDICATIONS  (STANDING):  acetaminophen   Tablet .. 975 milliGRAM(s) Oral every 8 hours  gabapentin 300 milliGRAM(s) Oral three times a day  ibuprofen  Tablet. 600 milliGRAM(s) Oral every 6 hours  influenza   Vaccine 0.5 milliLiter(s) IntraMuscular once  lidocaine   Patch 1 Patch Transdermal daily  polyethylene glycol 3350 17 Gram(s) Oral daily    MEDICATIONS  (PRN):  oxyCODONE    IR 5 milliGRAM(s) Oral every 4 hours PRN Moderate Pain (4 - 6)  oxyCODONE    IR 10 milliGRAM(s) Oral every 4 hours PRN Severe Pain (7 - 10)  tiZANidine 2 milliGRAM(s) Oral every 6 hours PRN Muscle Spasm    LABS:                        13.7   6.71  )-----------( 235      ( 16 Dec 2020 07:58 )             41.7     12-16    138  |  104  |  12  ----------------------------<  84  3.9   |  24  |  0.63    Ca    8.9      16 Dec 2020 07:58          CAPILLARY BLOOD GLUCOSE              REVIEW OF SYSTEMS:  CONSTITUTIONAL: No fever, weight loss, or fatigue  EYES: No eye pain, visual disturbances, or discharge  ENMT:  No difficulty hearing, tinnitus, vertigo; No sinus or throat pain  NECK: No pain or stiffness  RESPIRATORY: No cough, wheezing, chills or hemoptysis; No shortness of breath  CARDIOVASCULAR: No chest pain, palpitations, dizziness, or leg swelling  GASTROINTESTINAL: No abdominal or epigastric pain. No nausea, vomiting, or hematemesis; No diarrhea or constipation. No melena or hematochezia.  GENITOURINARY: No dysuria, frequency, hematuria, or incontinence  NEUROLOGICAL: No headaches, memory loss, loss of strength, numbness, or tremors      Consultant(s) Notes Reviewed:  [x ] YES  [ ] NO    PHYSICAL EXAM:  GENERAL: NAD, well-groomed, well-developed, not in any distress ,  HEAD:  Atraumatic, Normocephalic  EYES: EOMI, PERRLA, conjunctiva and sclera clear  ENMT: No tonsillar erythema, exudates, or enlargement; Moist mucous membranes, Good dentition, No lesions  NECK: Supple, No JVD, Normal thyroid  NERVOUS SYSTEM:  Alert & Oriented X3, No focal deficit   CHEST/LUNG: Good air entry bilateral with no  rales, rhonchi, wheezing, or rubs  HEART: Regular rate and rhythm; No murmurs, rubs, or gallops  ABDOMEN: Soft, Nontender, Nondistended; Bowel sounds present  EXTREMITIES:  2+ Peripheral Pulses, No clubbing, cyanosis, or edema    Care Discussed with Consultants/Other Providers [ x] YES  [ ] NO
    INTERVAL HPI/OVERNIGHT EVENTS: I feel lower back pain .   Vital Signs Last 24 Hrs  T(C): 36.9 (16 Dec 2020 12:01), Max: 36.9 (16 Dec 2020 12:01)  T(F): 98.4 (16 Dec 2020 12:01), Max: 98.4 (16 Dec 2020 12:01)  HR: 100 (16 Dec 2020 12:01) (73 - 100)  BP: 148/68 (16 Dec 2020 12:01) (128/79 - 148/90)  BP(mean): --  RR: 16 (16 Dec 2020 12:01) (16 - 18)  SpO2: 100% (16 Dec 2020 12:01) (98% - 100%)  I&O's Summary    MEDICATIONS  (STANDING):  acetaminophen   Tablet .. 975 milliGRAM(s) Oral every 8 hours  gabapentin 100 milliGRAM(s) Oral three times a day  ibuprofen  Tablet. 600 milliGRAM(s) Oral every 6 hours  influenza   Vaccine 0.5 milliLiter(s) IntraMuscular once  lidocaine   Patch 1 Patch Transdermal daily  polyethylene glycol 3350 17 Gram(s) Oral daily    MEDICATIONS  (PRN):  morphine  - Injectable 4 milliGRAM(s) IV Push every 4 hours PRN severe pain refractory to oral meds  traMADol 25 milliGRAM(s) Oral every 6 hours PRN Moderate Pain (4 - 6)  traMADol 50 milliGRAM(s) Oral every 6 hours PRN Severe Pain (7 - 10)    LABS:                        13.7   6.71  )-----------( 235      ( 16 Dec 2020 07:58 )             41.7     12-16    138  |  104  |  12  ----------------------------<  84  3.9   |  24  |  0.63    Ca    8.9      16 Dec 2020 07:58    TPro  7.1  /  Alb  3.8  /  TBili  0.3  /  DBili  x   /  AST  20  /  ALT  14  /  AlkPhos  84  12-15        CAPILLARY BLOOD GLUCOSE              REVIEW OF SYSTEMS:  CONSTITUTIONAL: No fever, weight loss, or fatigue  EYES: No eye pain, visual disturbances, or discharge  ENMT:  No difficulty hearing, tinnitus, vertigo; No sinus or throat pain  NECK: No pain or stiffness  RESPIRATORY: No cough, wheezing, chills or hemoptysis; No shortness of breath  CARDIOVASCULAR: No chest pain, palpitations, dizziness, or leg swelling  GASTROINTESTINAL: No abdominal or epigastric pain. No nausea, vomiting, or hematemesis; No diarrhea or constipation. No melena or hematochezia.  GENITOURINARY: No dysuria, frequency, hematuria, or incontinence  NEUROLOGICAL: No headaches, memory loss, loss of strength, numbness, or tremors  SKIN: No itching, burning, rashes, or lesions   ENDOCRINE: No heat or cold intolerance; No hair loss  MUSCULOSKELETAL: No joint pain or swelling; No muscle, back, or extremity pain    Consultant(s) Notes Reviewed:  [x ] YES  [ ] NO    PHYSICAL EXAM:  GENERAL: NAD, well-groomed, well-developed,not in any distress ,  HEAD:  Atraumatic, Normocephalic  EYES: EOMI, PERRLA, conjunctiva and sclera clear  ENMT: No tonsillar erythema, exudates, or enlargement; Moist mucous membranes, Good dentition, No lesions  NECK: Supple, No JVD, Normal thyroid  NERVOUS SYSTEM:  Alert & Oriented X3, No focal deficit , power G5 in LE. No sensory deficit   CHEST/LUNG: Good air entry bilateral with no  rales, rhonchi, wheezing, or rubs  HEART: Regular rate and rhythm; No murmurs, rubs, or gallops  ABDOMEN: Soft, Nontender, Nondistended; Bowel sounds present  EXTREMITIES:  2+ Peripheral Pulses, No clubbing, cyanosis, or edema  SKIN: No rashes or lesions    Care Discussed with Consultants/Other Providers [ x] YES  [ ] NO

## 2020-12-18 ENCOUNTER — TRANSCRIPTION ENCOUNTER (OUTPATIENT)
Age: 35
End: 2020-12-18

## 2020-12-18 VITALS
DIASTOLIC BLOOD PRESSURE: 69 MMHG | HEART RATE: 97 BPM | SYSTOLIC BLOOD PRESSURE: 130 MMHG | TEMPERATURE: 98 F | RESPIRATION RATE: 18 BRPM | OXYGEN SATURATION: 100 %

## 2020-12-18 LAB
ALBUMIN SERPL ELPH-MCNC: 4.1 G/DL — SIGNIFICANT CHANGE UP (ref 3.3–5)
ALP SERPL-CCNC: 83 U/L — SIGNIFICANT CHANGE UP (ref 40–120)
ALT FLD-CCNC: 21 U/L — SIGNIFICANT CHANGE UP (ref 4–33)
ANION GAP SERPL CALC-SCNC: 11 MMOL/L — SIGNIFICANT CHANGE UP (ref 7–14)
AST SERPL-CCNC: 21 U/L — SIGNIFICANT CHANGE UP (ref 4–32)
BILIRUB SERPL-MCNC: 0.2 MG/DL — SIGNIFICANT CHANGE UP (ref 0.2–1.2)
BUN SERPL-MCNC: 12 MG/DL — SIGNIFICANT CHANGE UP (ref 7–23)
CALCIUM SERPL-MCNC: 9.3 MG/DL — SIGNIFICANT CHANGE UP (ref 8.4–10.5)
CHLORIDE SERPL-SCNC: 103 MMOL/L — SIGNIFICANT CHANGE UP (ref 98–107)
CO2 SERPL-SCNC: 21 MMOL/L — LOW (ref 22–31)
CREAT SERPL-MCNC: 0.55 MG/DL — SIGNIFICANT CHANGE UP (ref 0.5–1.3)
GLUCOSE SERPL-MCNC: 135 MG/DL — HIGH (ref 70–99)
HCT VFR BLD CALC: 41.2 % — SIGNIFICANT CHANGE UP (ref 34.5–45)
HGB BLD-MCNC: 14.3 G/DL — SIGNIFICANT CHANGE UP (ref 11.5–15.5)
MAGNESIUM SERPL-MCNC: 1.8 MG/DL — SIGNIFICANT CHANGE UP (ref 1.6–2.6)
MCHC RBC-ENTMCNC: 29.7 PG — SIGNIFICANT CHANGE UP (ref 27–34)
MCHC RBC-ENTMCNC: 34.7 GM/DL — SIGNIFICANT CHANGE UP (ref 32–36)
MCV RBC AUTO: 85.7 FL — SIGNIFICANT CHANGE UP (ref 80–100)
NRBC # BLD: 0 /100 WBCS — SIGNIFICANT CHANGE UP
NRBC # FLD: 0 K/UL — SIGNIFICANT CHANGE UP
PHOSPHATE SERPL-MCNC: 1.4 MG/DL — LOW (ref 2.5–4.5)
PLATELET # BLD AUTO: 304 K/UL — SIGNIFICANT CHANGE UP (ref 150–400)
POTASSIUM SERPL-MCNC: 4.1 MMOL/L — SIGNIFICANT CHANGE UP (ref 3.5–5.3)
POTASSIUM SERPL-SCNC: 4.1 MMOL/L — SIGNIFICANT CHANGE UP (ref 3.5–5.3)
PROT SERPL-MCNC: 7.5 G/DL — SIGNIFICANT CHANGE UP (ref 6–8.3)
RBC # BLD: 4.81 M/UL — SIGNIFICANT CHANGE UP (ref 3.8–5.2)
RBC # FLD: 11.2 % — SIGNIFICANT CHANGE UP (ref 10.3–14.5)
SODIUM SERPL-SCNC: 135 MMOL/L — SIGNIFICANT CHANGE UP (ref 135–145)
WBC # BLD: 11.46 K/UL — HIGH (ref 3.8–10.5)
WBC # FLD AUTO: 11.46 K/UL — HIGH (ref 3.8–10.5)

## 2020-12-18 RX ORDER — OXYCODONE HYDROCHLORIDE 5 MG/1
1 TABLET ORAL
Qty: 12 | Refills: 0
Start: 2020-12-18 | End: 2020-12-20

## 2020-12-18 RX ORDER — TIZANIDINE 4 MG/1
1 TABLET ORAL
Qty: 28 | Refills: 0
Start: 2020-12-18 | End: 2020-12-24

## 2020-12-18 RX ORDER — POTASSIUM PHOSPHATE, MONOBASIC POTASSIUM PHOSPHATE, DIBASIC 236; 224 MG/ML; MG/ML
15 INJECTION, SOLUTION INTRAVENOUS ONCE
Refills: 0 | Status: COMPLETED | OUTPATIENT
Start: 2020-12-18 | End: 2020-12-18

## 2020-12-18 RX ORDER — SODIUM,POTASSIUM PHOSPHATES 278-250MG
1 POWDER IN PACKET (EA) ORAL
Qty: 8 | Refills: 0
Start: 2020-12-18 | End: 2020-12-19

## 2020-12-18 RX ORDER — LIDOCAINE 4 G/100G
1 CREAM TOPICAL
Qty: 14 | Refills: 0
Start: 2020-12-18 | End: 2020-12-31

## 2020-12-18 RX ORDER — GABAPENTIN 400 MG/1
1 CAPSULE ORAL
Qty: 90 | Refills: 0
Start: 2020-12-18 | End: 2021-01-16

## 2020-12-18 RX ORDER — ACETAMINOPHEN 500 MG
3 TABLET ORAL
Qty: 0 | Refills: 0 | DISCHARGE
Start: 2020-12-18

## 2020-12-18 RX ORDER — IBUPROFEN 200 MG
1 TABLET ORAL
Qty: 0 | Refills: 0 | DISCHARGE
Start: 2020-12-18

## 2020-12-18 RX ORDER — IBUPROFEN 200 MG
600 TABLET ORAL EVERY 6 HOURS
Refills: 0 | Status: DISCONTINUED | OUTPATIENT
Start: 2020-12-18 | End: 2020-12-18

## 2020-12-18 RX ORDER — POLYETHYLENE GLYCOL 3350 17 G/17G
17 POWDER, FOR SOLUTION ORAL
Qty: 0 | Refills: 0 | DISCHARGE
Start: 2020-12-18

## 2020-12-18 RX ORDER — ACETAMINOPHEN 500 MG
975 TABLET ORAL EVERY 8 HOURS
Refills: 0 | Status: DISCONTINUED | OUTPATIENT
Start: 2020-12-18 | End: 2020-12-18

## 2020-12-18 RX ORDER — SODIUM,POTASSIUM PHOSPHATES 278-250MG
1 POWDER IN PACKET (EA) ORAL
Refills: 0 | Status: DISCONTINUED | OUTPATIENT
Start: 2020-12-18 | End: 2020-12-18

## 2020-12-18 RX ORDER — TIZANIDINE 4 MG/1
1 TABLET ORAL
Qty: 56 | Refills: 0
Start: 2020-12-18 | End: 2020-12-31

## 2020-12-18 RX ORDER — IBUPROFEN 200 MG
1 TABLET ORAL
Qty: 28 | Refills: 0
Start: 2020-12-18 | End: 2020-12-24

## 2020-12-18 RX ADMIN — Medication 1 TABLET(S): at 12:16

## 2020-12-18 RX ADMIN — OXYCODONE HYDROCHLORIDE 10 MILLIGRAM(S): 5 TABLET ORAL at 08:24

## 2020-12-18 RX ADMIN — LIDOCAINE 1 PATCH: 4 CREAM TOPICAL at 00:48

## 2020-12-18 RX ADMIN — LIDOCAINE 1 PATCH: 4 CREAM TOPICAL at 12:17

## 2020-12-18 RX ADMIN — OXYCODONE HYDROCHLORIDE 10 MILLIGRAM(S): 5 TABLET ORAL at 09:12

## 2020-12-18 RX ADMIN — POLYETHYLENE GLYCOL 3350 17 GRAM(S): 17 POWDER, FOR SOLUTION ORAL at 12:16

## 2020-12-18 RX ADMIN — Medication 600 MILLIGRAM(S): at 05:49

## 2020-12-18 RX ADMIN — Medication 975 MILLIGRAM(S): at 05:50

## 2020-12-18 RX ADMIN — GABAPENTIN 300 MILLIGRAM(S): 400 CAPSULE ORAL at 15:07

## 2020-12-18 RX ADMIN — Medication 975 MILLIGRAM(S): at 15:05

## 2020-12-18 RX ADMIN — GABAPENTIN 300 MILLIGRAM(S): 400 CAPSULE ORAL at 05:50

## 2020-12-18 RX ADMIN — OXYCODONE HYDROCHLORIDE 10 MILLIGRAM(S): 5 TABLET ORAL at 00:47

## 2020-12-18 RX ADMIN — Medication 600 MILLIGRAM(S): at 12:16

## 2020-12-18 NOTE — DISCHARGE NOTE PROVIDER - CARE PROVIDERS DIRECT ADDRESSES
[email or web address]

## 2020-12-18 NOTE — DISCHARGE NOTE PROVIDER - NSDCMRMEDTOKEN_GEN_ALL_CORE_FT
acetaminophen 325 mg oral tablet: 3 tab(s) orally every 8 hours  ibuprofen 600 mg oral tablet: 1 tab(s) orally every 6 hours  polyethylene glycol 3350 oral powder for reconstitution: 17 gram(s) orally once a day

## 2020-12-18 NOTE — DISCHARGE NOTE PROVIDER - NSDCCPCAREPLAN_GEN_ALL_CORE_FT
PRINCIPAL DISCHARGE DIAGNOSIS  Diagnosis: Acute right-sided low back pain without sciatica  Assessment and Plan of Treatment:       SECONDARY DISCHARGE DIAGNOSES  Diagnosis: Intractable back pain  Assessment and Plan of Treatment:      PRINCIPAL DISCHARGE DIAGNOSIS  Diagnosis: Acute right-sided low back pain without sciatica  Assessment and Plan of Treatment: -  MR Thoracic Spine No Cont: T7-T8, there is a central disc extrusion with mild to moderate narrowing with abutment of the ventral aspect of the cord At L5-S1, there is a right central disc protrusion with mild central canal narrowing  - Neurosurgery consulted  - Complete Medrol dose adelaide as prescribed  - Outpatient physical therapy  - Follow up with Dr. Norris within 1 week of discharge from the hospital.  Please follow up with your primary care provider within 1 week of discharge from the hospital. If you do not have a primary care provider please follow up with the [facility], call [phone].      SECONDARY DISCHARGE DIAGNOSES  Diagnosis: Intractable back pain  Assessment and Plan of Treatment: - Pain management team was consulted  - Follow medications as prescribed  - Neurosurgery consulted  - Complete Medrol dose adelaide as prescribed  - Outpatient physical therapy  - Follow up with Dr. Norris within 1 week of discharge from the hospital.  Please follow up with your primary care provider within 1 week of discharge from the hospital. If you do not have a primary care provider please follow up with the [facility], call [phone].

## 2020-12-18 NOTE — DISCHARGE NOTE PROVIDER - PROVIDER TOKENS
PROVIDER:[TOKEN:[[id number]]] PROVIDER:[TOKEN:[[id number]]],FREE:[LAST:[[facility] care provider],PHONE:[(   )    -],FAX:[(   )    -]]

## 2020-12-18 NOTE — DISCHARGE NOTE NURSING/CASE MANAGEMENT/SOCIAL WORK - PATIENT PORTAL LINK FT
You can access the FollowMyHealth Patient Portal offered by [facility] by registering at the following website: [email or web address]. By joining [facility]’s FollowMyHealth portal, you will also be able to view your health information using other applications (apps) compatible with our system.

## 2020-12-18 NOTE — DISCHARGE NOTE PROVIDER - NSDCFUADDAPPT_GEN_ALL_CORE_FT
Please follow up with your primary care provider within 1 week of discharge from the hospital. If you do not have a primary care provider please follow up with the [facility], call [phone]

## 2020-12-18 NOTE — DISCHARGE NOTE PROVIDER - CARE PROVIDER_API CALL
Juan Norris  NEUROSURGERY - GENERAL  [address], [address]  [place] [address]  Phone: [phone]  Fax: [phone]  Follow Up Time:    Juan Norris  NEUROSURGERY - GENERAL  [address], [address]  [place] [address]  Phone: [phone]  Fax: [phone]  Follow Up Time:     [facility] provider,   Phone: (   )    -  Fax: (   )    -  Follow Up Time:

## 2020-12-18 NOTE — DISCHARGE NOTE PROVIDER - HOSPITAL COURSE
36 y/o F no PMH presenting with acute back pain over the last week with significant pain impairing ambulation but no focal deficits, concerning for muscular strain verses disc herniation.         Problem/Plan - 1:  ·  Problem: Acute back pain with sciatica, left.  Plan: Patient with acute onset back with sciatica like symptoms to the left but otherwise no focal neuro deficits.  - pain control as below  - PT evaluation  -  MR Thoracic Spine No Cont (12.16.20 @ 11:33) >  IMPRESSION:    Maintenance of the usual thoracic and lumbar curvature without fracture or listhesis.     T7-T8, there is a central disc extrusion with mild to moderate narrowing with abutment of the ventral aspect of the cord.    At L5-S1, there is a right central disc protrusion with mild central canal narrowing.    Neurosurgery consulted and outpt follow up.      Problem/Plan - 2:  ·  Problem: Pain management.  Plan: Intractable back pain impairing ambulation, likely MSK etiology  - start standing tylenol  - gabapentin 100 mg TID  - ibuprfen 600 mg q6 hrs  - tramadol 25 mg q6 for moderate and tramadol 50 mg q6 for severe  - morphine 4 mg q4 hrs breakthrough.   - Pain management consult.           36 y/o F no PMH presenting with acute back pain over the last week with significant pain impairing ambulation but no focal deficits, concerning for muscular strain verses disc herniation.      Acute back pain with sciatica  - Patient with acute onset back with sciatica like symptoms to the left but otherwise no focal neuro deficits.  - pain management team was consulted for pain control   - PT evaluation  -  MR Thoracic Spine No Cont: T7-T8, there is a central disc extrusion with mild to moderate narrowing with abutment of the ventral aspect of the cord At L5-S1, there is a right central disc protrusion with mild central canal narrowing  - Neurosurgery consulted and outpt follow up.     Pain management  - Intractable back pain impairing ambulation, likely MSK etiology  - start standing tylenol  - gabapentin 100 mg TID  - ibuprfen 600 mg q6 hrs  - tramadol 25 mg q6 for moderate and tramadol 50 mg q6 for severe  - morphine 4 mg q4 hrs breakthrough.   - Pain management consulted  - Pt recs home with home PT, per  will not cover home PT, outpatient physical therapy script to be given to patient.     Discussed case with Dr. Mcbride on 12/18/2020, patient medically stable for discharge to home             34 y/o F no PMH presenting with acute back pain over the last week with significant pain impairing ambulation but no focal deficits, concerning for muscular strain verses disc herniation.      Acute back pain with sciatica  - Patient with acute onset back with sciatica like symptoms to the left but otherwise no focal neuro deficits.  - pain management team was consulted for pain control   - PT evaluation  -  MR Thoracic Spine No Cont: T7-T8, there is a central disc extrusion with mild to moderate narrowing with abutment of the ventral aspect of the cord At L5-S1, there is a right central disc protrusion with mild central canal narrowing  - Neurosurgery consulted and outpt follow up. - ISTOP Reference #: [id number]     Pain management  - Intractable back pain impairing ambulation, likely MSK etiology  - start standing tylenol  - gabapentin 100 mg TID  - ibuprfen 600 mg q6 hrs  - tramadol 25 mg q6 for moderate and tramadol 50 mg q6 for severe  - morphine 4 mg q4 hrs breakthrough.   - Pain management consulted  - Pt recs home with home PT, per  will not cover home PT, outpatient physical therapy script to be given to patient.     Discussed case with Dr. Mcbride on 12/18/2020, patient medically stable for discharge to home

## 2021-01-12 ENCOUNTER — APPOINTMENT (OUTPATIENT)
Dept: NEUROSURGERY | Facility: CLINIC | Age: 36
End: 2021-01-12
Payer: COMMERCIAL

## 2021-01-12 ENCOUNTER — TRANSCRIPTION ENCOUNTER (OUTPATIENT)
Age: 36
End: 2021-01-12

## 2021-01-12 VITALS
OXYGEN SATURATION: 97 % | HEIGHT: 63 IN | DIASTOLIC BLOOD PRESSURE: 95 MMHG | BODY MASS INDEX: 31.36 KG/M2 | WEIGHT: 177 LBS | HEART RATE: 82 BPM | RESPIRATION RATE: 16 BRPM | SYSTOLIC BLOOD PRESSURE: 136 MMHG

## 2021-01-12 DIAGNOSIS — Z87.891 PERSONAL HISTORY OF NICOTINE DEPENDENCE: ICD-10-CM

## 2021-01-12 DIAGNOSIS — M54.16 RADICULOPATHY, LUMBAR REGION: ICD-10-CM

## 2021-01-12 DIAGNOSIS — Z78.9 OTHER SPECIFIED HEALTH STATUS: ICD-10-CM

## 2021-01-12 DIAGNOSIS — M51.26 OTHER INTERVERTEBRAL DISC DISPLACEMENT, LUMBAR REGION: ICD-10-CM

## 2021-01-12 PROCEDURE — 99072 ADDL SUPL MATRL&STAF TM PHE: CPT

## 2021-01-12 PROCEDURE — 99214 OFFICE O/P EST MOD 30 MIN: CPT

## 2021-01-13 PROBLEM — Z87.891 FORMER SMOKER: Status: ACTIVE | Noted: 2021-01-12

## 2021-01-13 PROBLEM — Z78.9 CONSUMES ALCOHOL OCCASIONALLY: Status: ACTIVE | Noted: 2021-01-12

## 2021-01-13 RX ORDER — GABAPENTIN 300 MG/1
300 CAPSULE ORAL
Refills: 0 | Status: ACTIVE | COMMUNITY

## 2021-01-13 RX ORDER — TIZANIDINE 2 MG/1
2 TABLET ORAL
Refills: 0 | Status: ACTIVE | COMMUNITY

## 2021-01-13 RX ORDER — AMLODIPINE BESYLATE 5 MG/1
5 TABLET ORAL
Refills: 0 | Status: ACTIVE | COMMUNITY

## 2021-01-13 NOTE — PHYSICAL EXAM
[General Appearance - Alert] : alert [General Appearance - In No Acute Distress] : in no acute distress [Oriented To Time, Place, And Person] : oriented to person, place, and time [Impaired Insight] : insight and judgment were intact [Cranial Nerves Optic (II)] : visual acuity intact bilaterally,  pupils equal round and reactive to light [Cranial Nerves Oculomotor (III)] : extraocular motion intact [Cranial Nerves Trigeminal (V)] : facial sensation intact symmetrically [Cranial Nerves Facial (VII)] : face symmetrical [Cranial Nerves Vestibulocochlear (VIII)] : hearing was intact bilaterally [Cranial Nerves Glossopharyngeal (IX)] : tongue and palate midline [Cranial Nerves Accessory (XI - Cranial And Spinal)] : head turning and shoulder shrug symmetric [Cranial Nerves Hypoglossal (XII)] : there was no tongue deviation with protrusion [Motor Tone] : muscle tone was normal in all four extremities [3] : L2 Iliopsoas 3/5 [No Visual Abnormalities] : no visible abnormailities [Straight-Leg Raise Test - Right] : straight leg raise of the right leg was positive [Antalgic] : antalgic [Intact] : all reflexes within normal limits bilaterally [PERRL With Normal Accommodation] : pupils were equal in size, round, reactive to light, with normal accommodation [Sclera] : the sclera and conjunctiva were normal [Hearing Threshold Finger Rub Not [place]] : hearing was normal [Neck Appearance] : the appearance of the neck was normal [] : no respiratory distress [Edema] : there was no peripheral edema [Involuntary Movements] : no involuntary movements were seen [Skin Color & Pigmentation] : normal skin color and pigmentation

## 2021-01-18 NOTE — RESULTS/DATA
[FreeTextEntry1] : [facility]\par    [facility] Department of Radiology\par   Radiology Report\par \par \par Patient Name: VARGHESE CRUZ  Report Date: 16-Dec-2020 11:58.00 \par Patient ID: [id number] ([id number]), [id number] ([id number])  Accession No.: [id number] \par Patient Birth Date: 1985  Report Status: F \par Referring Physician: [id number] TAMIKO SANCHEZ   Reason For Study: acute severe back pain  \par \par \par \par \par \par \par EXAM: MR SPINE LUMBAR\par \par EXAM: MR SPINE THORACIC\par \par \par PROCEDURE DATE: Dec 16 2020\par \par \par \par INTERPRETATION: MR OF THE THORACOLUMBAR SPINE WITHOUT CONTRAST\par \par CLINICAL INDICATION: Acute severe back pain.\par \par TECHNIQUE: Multiplanar, multisequence MR imaging of the thoracolumbar spine was performed.\par \par COMPARISON: Relevant portions of CT chest, 9/11/2016.\par \par FINDINGS:\par Thoracic spine:\par There is maintenance of the usual thoracic kyphosis without fracture or malalignment. The vertebral body heights are maintained. The bone marrow signal is homogeneous throughout. The intervertebral disc spaces are also maintained with the exception of disc height loss at T7-T8. The pre and paravertebral soft tissues are unremarkable.\par \par There is no abnormal signal intensity in the thoracic spinal cord.\par \par There is a Schmorl node in the inferior endplate of T11. There is a central disc extrusion at T7-T8 with mild to moderate spinal canal narrowing and spinal cord abutment without compression. Otherwise, no other significant disc bulges or herniations are seen in the rest of the thoracic spine. No subarticular zone, or significant neural foraminal narrowing.\par \par Lumbar spine:\par \par There is anatomic alignment of the usual lumbar lordosis without fracture. The vertebral body heights are maintained. The bone marrow signal is homogeneous throughout. The intervertebral disc spaces demonstrate maintenance of height with exception of vertebral body height and signal loss at L5-S1.\par \par The pre and paravertebral soft tissues are unremarkable.\par \par The conus medullaris ends in L1 is normal in signal and contour. The cauda equina nerve roots are normal in configuration.\par \par At L5-S1: There is a right central disc protrusion with mild spinal canal narrowing. There is no significant neural foraminal narrowing.\par \par IMPRESSION:\par \par Maintenance of the usual thoracic and lumbar curvature without fracture or listhesis.\par \par At T7-T8, there is a central disc extrusion with mild to moderate narrowing with abutment of the ventral aspect of the cord.\par \par At L5-S1, there is a right central disc protrusion with mild central canal narrowing.\par \par \par \par \par \par \par KARTHIK SORENSON M.D., ATTENDING RADIOLOGIST\par This document has been electronically signed. Dec 16 2020 11:58AM \par

## 2021-01-18 NOTE — HISTORY OF PRESENT ILLNESS
[FreeTextEntry1] : 36 yo female who arrives for HFU after acute episode of low back pain.  Pain is localized to low back, she is ambulating with a walker for support, and has improved since inpatient when she was unable to walk due to pain. She was discharged with Gabapentin 100 mg 1 tab TID, and has had f/u with a new PCP.  \par \par MRI's reviewed showing small disc herniations at T7-8, and L5-S1 which are non surgical.  \par \par MMT:  5/5 x 4 except LRE hip flexion 3/5 due to pain\par DTR:  2+ throughout

## 2021-01-18 NOTE — ASSESSMENT
[FreeTextEntry1] : L5 radiculopathy which is non surgical at this point.  Referral to Dr. Chowdhury for possible injections provided.  F/u as needed.\par \par 1)  Pain management - Dr. Chowdhury\par 2)  f/u PRN

## 2021-01-18 NOTE — REVIEW OF SYSTEMS
[Tingling] : tingling [Difficulty Walking] : difficulty walking [Limping] : limping [As Noted in HPI] : as noted in HPI [Negative] : Endocrine [Abdominal Pain] : no abdominal pain [Vomiting] : no vomiting [Diarrhea] : no diarrhea [Dysuria] : no dysuria [Incontinence] : no incontinence [Skin Lesions] : no skin lesions [Easy Bruising] : no tendency for easy bruising [Easy Bleeding] : no tendency for easy bleeding

## 2022-12-24 NOTE — ED ADULT NURSE NOTE - CHIEF COMPLAINT QUOTE
Pt. brought to [facility] ED by LILLI. Pt. was getting out of bed this morning and felt pain in back. Pt. is unable to sit down. Lying prone on stretcher. Took muscle relaxer(does not know name) at around 1330 pm. NAD noted. clear to auscultation bilaterally/no wheezes/no rales/no rhonchi/no respiratory distress/airway patent/breath sounds equal/good air movement

## 2023-04-29 ENCOUNTER — EMERGENCY (EMERGENCY)
Facility: HOSPITAL | Age: 38
LOS: 0 days | Discharge: HOME | End: 2023-04-29
Attending: STUDENT IN AN ORGANIZED HEALTH CARE EDUCATION/TRAINING PROGRAM
Payer: MEDICAID

## 2023-04-29 VITALS
DIASTOLIC BLOOD PRESSURE: 93 MMHG | HEART RATE: 89 BPM | SYSTOLIC BLOOD PRESSURE: 146 MMHG | RESPIRATION RATE: 18 BRPM | OXYGEN SATURATION: 96 % | TEMPERATURE: 99 F

## 2023-04-29 VITALS
HEART RATE: 100 BPM | DIASTOLIC BLOOD PRESSURE: 110 MMHG | WEIGHT: 154.98 LBS | TEMPERATURE: 98 F | SYSTOLIC BLOOD PRESSURE: 165 MMHG | HEIGHT: 63 IN | RESPIRATION RATE: 19 BRPM | OXYGEN SATURATION: 98 %

## 2023-04-29 DIAGNOSIS — R10.9 UNSPECIFIED ABDOMINAL PAIN: ICD-10-CM

## 2023-04-29 DIAGNOSIS — N23 UNSPECIFIED RENAL COLIC: ICD-10-CM

## 2023-04-29 DIAGNOSIS — N39.0 URINARY TRACT INFECTION, SITE NOT SPECIFIED: ICD-10-CM

## 2023-04-29 DIAGNOSIS — Z87.442 PERSONAL HISTORY OF URINARY CALCULI: ICD-10-CM

## 2023-04-29 DIAGNOSIS — R30.0 DYSURIA: ICD-10-CM

## 2023-04-29 DIAGNOSIS — Z90.79 ACQUIRED ABSENCE OF OTHER GENITAL ORGAN(S): Chronic | ICD-10-CM

## 2023-04-29 DIAGNOSIS — R35.0 FREQUENCY OF MICTURITION: ICD-10-CM

## 2023-04-29 LAB
ALBUMIN SERPL ELPH-MCNC: 3.6 G/DL — SIGNIFICANT CHANGE UP (ref 3.3–5)
ALP SERPL-CCNC: 91 U/L — SIGNIFICANT CHANGE UP (ref 40–120)
ALT FLD-CCNC: 23 U/L — SIGNIFICANT CHANGE UP (ref 12–78)
ANION GAP SERPL CALC-SCNC: 1 MMOL/L — LOW (ref 5–17)
APPEARANCE UR: ABNORMAL
AST SERPL-CCNC: 17 U/L — SIGNIFICANT CHANGE UP (ref 15–37)
BACTERIA # UR AUTO: ABNORMAL
BASOPHILS # BLD AUTO: 0.04 K/UL — SIGNIFICANT CHANGE UP (ref 0–0.2)
BASOPHILS NFR BLD AUTO: 0.3 % — SIGNIFICANT CHANGE UP (ref 0–2)
BILIRUB SERPL-MCNC: 0.4 MG/DL — SIGNIFICANT CHANGE UP (ref 0.2–1.2)
BILIRUB UR-MCNC: ABNORMAL
BUN SERPL-MCNC: 8 MG/DL — SIGNIFICANT CHANGE UP (ref 7–23)
CALCIUM SERPL-MCNC: 9.8 MG/DL — SIGNIFICANT CHANGE UP (ref 8.5–10.1)
CHLORIDE SERPL-SCNC: 104 MMOL/L — SIGNIFICANT CHANGE UP (ref 96–108)
CO2 SERPL-SCNC: 31 MMOL/L — SIGNIFICANT CHANGE UP (ref 22–31)
COLOR SPEC: ABNORMAL
CREAT SERPL-MCNC: 0.92 MG/DL — SIGNIFICANT CHANGE UP (ref 0.5–1.3)
DIFF PNL FLD: ABNORMAL
EGFR: 83 ML/MIN/1.73M2 — SIGNIFICANT CHANGE UP
EOSINOPHIL # BLD AUTO: 0.05 K/UL — SIGNIFICANT CHANGE UP (ref 0–0.5)
EOSINOPHIL NFR BLD AUTO: 0.3 % — SIGNIFICANT CHANGE UP (ref 0–6)
EPI CELLS # UR: ABNORMAL
GLUCOSE SERPL-MCNC: 119 MG/DL — HIGH (ref 70–99)
GLUCOSE UR QL: NEGATIVE MG/DL — SIGNIFICANT CHANGE UP
HCG SERPL-ACNC: <1 MIU/ML — SIGNIFICANT CHANGE UP
HCT VFR BLD CALC: 42.7 % — SIGNIFICANT CHANGE UP (ref 34.5–45)
HGB BLD-MCNC: 14.7 G/DL — SIGNIFICANT CHANGE UP (ref 11.5–15.5)
IMM GRANULOCYTES NFR BLD AUTO: 0.5 % — SIGNIFICANT CHANGE UP (ref 0–0.9)
KETONES UR-MCNC: NEGATIVE — SIGNIFICANT CHANGE UP
LEUKOCYTE ESTERASE UR-ACNC: ABNORMAL
LIDOCAIN IGE QN: 150 U/L — SIGNIFICANT CHANGE UP (ref 73–393)
LYMPHOCYTES # BLD AUTO: 1.52 K/UL — SIGNIFICANT CHANGE UP (ref 1–3.3)
LYMPHOCYTES # BLD AUTO: 10.3 % — LOW (ref 13–44)
MAGNESIUM SERPL-MCNC: 2.1 MG/DL — SIGNIFICANT CHANGE UP (ref 1.6–2.6)
MCHC RBC-ENTMCNC: 29.8 PG — SIGNIFICANT CHANGE UP (ref 27–34)
MCHC RBC-ENTMCNC: 34.4 G/DL — SIGNIFICANT CHANGE UP (ref 32–36)
MCV RBC AUTO: 86.6 FL — SIGNIFICANT CHANGE UP (ref 80–100)
MONOCYTES # BLD AUTO: 0.85 K/UL — SIGNIFICANT CHANGE UP (ref 0–0.9)
MONOCYTES NFR BLD AUTO: 5.7 % — SIGNIFICANT CHANGE UP (ref 2–14)
NEUTROPHILS # BLD AUTO: 12.26 K/UL — HIGH (ref 1.8–7.4)
NEUTROPHILS NFR BLD AUTO: 82.9 % — HIGH (ref 43–77)
NITRITE UR-MCNC: POSITIVE
NRBC # BLD: 0 /100 WBCS — SIGNIFICANT CHANGE UP (ref 0–0)
PH UR: 6 — SIGNIFICANT CHANGE UP (ref 5–8)
PLATELET # BLD AUTO: 402 K/UL — HIGH (ref 150–400)
POTASSIUM SERPL-MCNC: 3.2 MMOL/L — LOW (ref 3.5–5.3)
POTASSIUM SERPL-SCNC: 3.2 MMOL/L — LOW (ref 3.5–5.3)
PROT SERPL-MCNC: 8.8 GM/DL — HIGH (ref 6–8.3)
PROT UR-MCNC: 30 MG/DL
RBC # BLD: 4.93 M/UL — SIGNIFICANT CHANGE UP (ref 3.8–5.2)
RBC # FLD: 11.7 % — SIGNIFICANT CHANGE UP (ref 10.3–14.5)
RBC CASTS # UR COMP ASSIST: ABNORMAL /HPF (ref 0–4)
SODIUM SERPL-SCNC: 136 MMOL/L — SIGNIFICANT CHANGE UP (ref 135–145)
SP GR SPEC: 1.02 — SIGNIFICANT CHANGE UP (ref 1.01–1.02)
UROBILINOGEN FLD QL: 12 MG/DL
WBC # BLD: 14.8 K/UL — HIGH (ref 3.8–10.5)
WBC # FLD AUTO: 14.8 K/UL — HIGH (ref 3.8–10.5)
WBC UR QL: SIGNIFICANT CHANGE UP

## 2023-04-29 PROCEDURE — G1004: CPT

## 2023-04-29 PROCEDURE — 99285 EMERGENCY DEPT VISIT HI MDM: CPT

## 2023-04-29 PROCEDURE — 74176 CT ABD & PELVIS W/O CONTRAST: CPT | Mod: 26,ME

## 2023-04-29 RX ORDER — KETOROLAC TROMETHAMINE 30 MG/ML
15 SYRINGE (ML) INJECTION ONCE
Refills: 0 | Status: DISCONTINUED | OUTPATIENT
Start: 2023-04-29 | End: 2023-04-29

## 2023-04-29 RX ORDER — OXYCODONE HYDROCHLORIDE 5 MG/1
1 TABLET ORAL
Qty: 12 | Refills: 0
Start: 2023-04-29 | End: 2023-05-01

## 2023-04-29 RX ORDER — MORPHINE SULFATE 50 MG/1
4 CAPSULE, EXTENDED RELEASE ORAL ONCE
Refills: 0 | Status: DISCONTINUED | OUTPATIENT
Start: 2023-04-29 | End: 2023-04-29

## 2023-04-29 RX ORDER — CEFTRIAXONE 500 MG/1
1000 INJECTION, POWDER, FOR SOLUTION INTRAMUSCULAR; INTRAVENOUS ONCE
Refills: 0 | Status: COMPLETED | OUTPATIENT
Start: 2023-04-29 | End: 2023-04-29

## 2023-04-29 RX ORDER — TAMSULOSIN HYDROCHLORIDE 0.4 MG/1
1 CAPSULE ORAL
Qty: 7 | Refills: 0
Start: 2023-04-29 | End: 2023-05-05

## 2023-04-29 RX ORDER — TAMSULOSIN HYDROCHLORIDE 0.4 MG/1
0.4 CAPSULE ORAL ONCE
Refills: 0 | Status: COMPLETED | OUTPATIENT
Start: 2023-04-29 | End: 2023-04-29

## 2023-04-29 RX ORDER — SODIUM CHLORIDE 9 MG/ML
1000 INJECTION INTRAMUSCULAR; INTRAVENOUS; SUBCUTANEOUS ONCE
Refills: 0 | Status: COMPLETED | OUTPATIENT
Start: 2023-04-29 | End: 2023-04-29

## 2023-04-29 RX ADMIN — TAMSULOSIN HYDROCHLORIDE 0.4 MILLIGRAM(S): 0.4 CAPSULE ORAL at 22:49

## 2023-04-29 RX ADMIN — Medication 15 MILLIGRAM(S): at 14:01

## 2023-04-29 RX ADMIN — MORPHINE SULFATE 4 MILLIGRAM(S): 50 CAPSULE, EXTENDED RELEASE ORAL at 17:10

## 2023-04-29 RX ADMIN — CEFTRIAXONE 1000 MILLIGRAM(S): 500 INJECTION, POWDER, FOR SOLUTION INTRAMUSCULAR; INTRAVENOUS at 14:08

## 2023-04-29 RX ADMIN — Medication 15 MILLIGRAM(S): at 21:51

## 2023-04-29 RX ADMIN — MORPHINE SULFATE 4 MILLIGRAM(S): 50 CAPSULE, EXTENDED RELEASE ORAL at 17:40

## 2023-04-29 RX ADMIN — SODIUM CHLORIDE 1000 MILLILITER(S): 9 INJECTION INTRAMUSCULAR; INTRAVENOUS; SUBCUTANEOUS at 14:08

## 2023-04-29 RX ADMIN — Medication 15 MILLIGRAM(S): at 21:36

## 2023-04-29 RX ADMIN — CEFTRIAXONE 100 MILLIGRAM(S): 500 INJECTION, POWDER, FOR SOLUTION INTRAMUSCULAR; INTRAVENOUS at 14:07

## 2023-04-29 RX ADMIN — MORPHINE SULFATE 4 MILLIGRAM(S): 50 CAPSULE, EXTENDED RELEASE ORAL at 22:42

## 2023-04-29 RX ADMIN — SODIUM CHLORIDE 1000 MILLILITER(S): 9 INJECTION INTRAMUSCULAR; INTRAVENOUS; SUBCUTANEOUS at 13:20

## 2023-04-29 NOTE — ED PROVIDER NOTE - CARE PROVIDER_API CALL
Jane Ferguson)  Urology  [address] [place] [address]  Phone: [phone]  Fax: [phone]  Follow Up Time:

## 2023-04-29 NOTE — ED PROVIDER NOTE - CLINICAL SUMMARY MEDICAL DECISION MAKING FREE TEXT BOX
Pt here with uti sx and l flank pain. pyelo vs stone. Hx of renal stone. uti on ua. will f/u ct a/p to r/o stone. If neg will treat as pyelo. If hemodynamically stable can dc home with abx.

## 2023-04-29 NOTE — ED ADULT NURSE NOTE - NS TRANSFER PATIENT BELONGINGS
Encounter Summary
  Created on: 2020
 
 SantosVeda
 External Reference #: [id number]
 : 44
 Sex: Female
 
 Demographics
 
 
+-----------------------+----------------------+
| Address               | [address]     |
|                       | [place]  [address] |
+-----------------------+----------------------+
| Home Phone            | [phone]      |
+-----------------------+----------------------+
| Preferred Language    | Unknown              |
+-----------------------+----------------------+
| Marital Status        |               |
+-----------------------+----------------------+
| Latter-day Affiliation | [id number]                 |
+-----------------------+----------------------+
| Race                  | Unknown              |
+-----------------------+----------------------+
| Ethnic Group          | Unknown              |
+-----------------------+----------------------+
 
 
 Author
 
 
+--------------+--------------------------------------------+
| Author       | [facility]  |
|              | and [facility]                                |
+--------------+--------------------------------------------+
| Organization | [facility]  |
|              | and [facility]                                |
+--------------+--------------------------------------------+
| Address      | Unknown                                    |
+--------------+--------------------------------------------+
| Phone        | Unavailable                                |
+--------------+--------------------------------------------+
 
 
 
 Support
 
 
+----------------+--------------+-----------------+-----------------+
| Name           | Relationship | Address         | Phone           |
+----------------+--------------+-----------------+-----------------+
| Johan Santos | [place]         | [address] | [phone] |
|                |              | [place]  [address] |                 |
+----------------+--------------+-----------------+-----------------+
| Ariel Santos   | [place]         | Unknown         | [phone] |
+----------------+--------------+-----------------+-----------------+
| Luis Santos   | [place]         | Unknown         | [phone] |
 
+----------------+--------------+-----------------+-----------------+
 
 
 
 Care Team Providers
 
 
+-----------------------+------+-------------+
| Care Team Member Name | Role | Phone       |
+-----------------------+------+-------------+
 PCP  | Unavailable |
+-----------------------+------+-------------+
 
 
 
 Encounter Details
 
 
+--------+-------------+---------------------+----------------------+---------------------+
| Date   | Type        | Department          | Care Team            | Description         |
+--------+-------------+---------------------+----------------------+---------------------+
| / | Orders Only |   [facility]         |   [place],       | Chronic kidney      |
| 2016   |             | NEPHROLOGY  [address]   | [address] | disease, stage III  |
|        |             | [address]   |  [address] [place]    | (moderate)          |
|        |             | [place], [place]     | [address]  [place], [place] |                     |
|        |             | [address]          |  [address]  [phone] |                     |
|        |             | [phone]        |   [phone] (Fax) |                     |
+--------+-------------+---------------------+----------------------+---------------------+
 
 
 
 Social History
 
 
+---------------+------------+-----------+--------+------------------+
| Tobacco Use   | Types      | Packs/Day | Years  | Date             |
|               |            |           | Used   |                  |
+---------------+------------+-----------+--------+------------------+
| Former Smoker | Cigarettes | 0.25      | 5      | Quit: 1968 |
+---------------+------------+-----------+--------+------------------+
 
 
 
+---------------------+---+---+---+
| Smokeless Tobacco:  |   |   |   |
| Never Used          |   |   |   |
+---------------------+---+---+---+
 
 
 
+-------------+-------------+---------+--------------+
| Alcohol Use | Drinks/Week | oz/Week | Comments     |
+-------------+-------------+---------+--------------+
| Yes         |             |         | Twice a year |
+-------------+-------------+---------+--------------+
 
 
 
+------------------+---------------+
| Sex Assigned at  | Date Recorded |
 
| Birth            |               |
+------------------+---------------+
| Not on file      |               |
+------------------+---------------+
 documented as of this encounter
 
 Plan of Treatment
 
 
+--------+---------+------------+----------------------+-------------+
| Date   | Type    | Specialty  | Care Team            | Description |
+--------+---------+------------+----------------------+-------------+
| / | Office  | Nephrology |   Dante Escudero MD  |             |
| 2020   | Visit   |            |  [address]  |             |
|        |         |            | [address]  [facility] [place]   |             |
|        |         |            | [address]  [phone]  |             |
|        |         |            |  [phone] (Fax)  |             |
+--------+---------+------------+----------------------+-------------+
 documented as of this encounter
 
 Visit Diagnoses
 
 
+------------------------------------------------------------------------------------------+
| Diagnosis                                                                                |
+------------------------------------------------------------------------------------------+
|   Chronic kidney disease, stage III (moderate) (HCC)  Chronic kidney disease, Stage III  |
| (moderate)                                                                               |
+------------------------------------------------------------------------------------------+
 documented in this encounter Electronic Device (specify)/Clothing

## 2023-04-29 NOTE — ED PROVIDER NOTE - NSFOLLOWUPINSTRUCTIONS_ED_ALL_ED_FT
1) Take tylenol or motrin for pain  2) Take prescription medication as instructed  3) Follow-up with urology  4) Follow up with your primary care doctor  5) Return to the ER for worsening or concerning symptoms especially if you have fever or vomiting with inability to tolerate liquids, or persistent pain

## 2023-04-29 NOTE — ED PROVIDER NOTE - OBJECTIVE STATEMENT
38 yo F PMH kidney stone, that did not need urologic intervention, here with L sided flank pain, dysuria, urinary frequency, and urgency. Denies hematuria. Bought OTC azo for discomfort. Afebrile. No active vomiting in ED.

## 2023-04-29 NOTE — ED PROVIDER NOTE - PATIENT PORTAL LINK FT
You can access the FollowMyHealth Patient Portal offered by [facility] by registering at the following website: [email or web address]. By joining [facility]’s FollowMyHealth portal, you will also be able to view your health information using other applications (apps) compatible with our system.

## 2023-05-09 ENCOUNTER — APPOINTMENT (OUTPATIENT)
Dept: UROLOGY | Facility: CLINIC | Age: 38
End: 2023-05-09
Payer: MEDICAID

## 2023-05-09 VITALS
OXYGEN SATURATION: 96 % | HEIGHT: 63 IN | SYSTOLIC BLOOD PRESSURE: 125 MMHG | HEART RATE: 100 BPM | BODY MASS INDEX: 27.46 KG/M2 | DIASTOLIC BLOOD PRESSURE: 80 MMHG | WEIGHT: 155 LBS | RESPIRATION RATE: 16 BRPM | TEMPERATURE: 97.5 F

## 2023-05-09 DIAGNOSIS — N21.9 CALCULUS OF LOWER URINARY TRACT, UNSPECIFIED: ICD-10-CM

## 2023-05-09 DIAGNOSIS — N39.0 URINARY TRACT INFECTION, SITE NOT SPECIFIED: ICD-10-CM

## 2023-05-09 PROCEDURE — 99202 OFFICE O/P NEW SF 15 MIN: CPT

## 2023-05-09 RX ORDER — TAMSULOSIN HYDROCHLORIDE 0.4 MG/1
0.4 CAPSULE ORAL
Qty: 15 | Refills: 0 | Status: ACTIVE | COMMUNITY
Start: 2023-05-09 | End: 1900-01-01

## 2023-05-09 NOTE — HISTORY OF PRESENT ILLNESS
[FreeTextEntry1] : 36y/o female with recent UTI from 2mm left UVJ stone.\par Refers still discomfort, no hematuria.

## 2023-05-09 NOTE — PHYSICAL EXAM
[General Appearance - Well Developed] : well developed [General Appearance - Well Nourished] : well nourished [Normal Appearance] : normal appearance [Well Groomed] : well groomed [General Appearance - In No Acute Distress] : no acute distress [Edema] : no peripheral edema [Exaggerated Use Of Accessory Muscles For Inspiration] : no accessory muscle use [Respiration, Rhythm And Depth] : normal respiratory rhythm and effort [Abdomen Soft] : soft [Abdomen Tenderness] : non-tender [Costovertebral Angle Tenderness] : no ~M costovertebral angle tenderness [FreeTextEntry1] : Kidney percussion test negative bilaterally, no pain reported on bi-manual palpation bilaterally, no pain on superficial and deep palpation on the iliac fossa bilaterally and on the ureteral points [Urinary Bladder Findings] : the bladder was normal on palpation [Normal Station and Gait] : the gait and station were normal for the patient's age [] : no rash [No Focal Deficits] : no focal deficits [Oriented To Time, Place, And Person] : oriented to person, place, and time [Affect] : the affect was normal [Mood] : the mood was normal [Not Anxious] : not anxious [No Palpable Adenopathy] : no palpable adenopathy

## 2023-05-09 NOTE — REVIEW OF SYSTEMS
[Feeling Tired] : feeling tired [Abdominal Pain] : abdominal pain [Urine Infection (bladder/kidney)] : bladder/kidney infection [Pain during urination] : pain during urination [Negative] : Heme/Lymph [FreeTextEntry2] : high blood pressure

## 2023-05-09 NOTE — ASSESSMENT
[FreeTextEntry1] : 38y/o female with recent UTI from 2mm left UVJ stone.\par Refers still discomfort, no hematuria.\par \par Kidney percussion test negative bilaterally, no pain reported on bi-manual palpation bilaterally, no pain on superficial and deep palpation on the iliac fossa bilaterally and on the ureteral points \par \par Still on ABX for 1 week, in 15 days will collect urine for culture and UA.\par Prescribing flomax for 15 days.\par \par On F/U will eventually also perform new CT scan to evaluate stone.

## 2023-05-23 ENCOUNTER — APPOINTMENT (OUTPATIENT)
Dept: UROLOGY | Facility: CLINIC | Age: 38
End: 2023-05-23

## 2023-09-28 NOTE — DISCHARGE NOTE NURSING/CASE MANAGEMENT/SOCIAL WORK - NSSCNAMETXT_GEN_ALL_CORE
[facility] Information: Selecting Yes will display possible errors in your note based on the variables you have selected. This validation is only offered as a suggestion for you. PLEASE NOTE THAT THE VALIDATION TEXT WILL BE REMOVED WHEN YOU FINALIZE YOUR NOTE. IF YOU WANT TO FAX A PRELIMINARY NOTE YOU WILL NEED TO TOGGLE THIS TO 'NO' IF YOU DO NOT WANT IT IN YOUR FAXED NOTE.

## 2024-03-03 NOTE — PHYSICAL THERAPY INITIAL EVALUATION ADULT - PHYSICAL ASSIST/NONPHYSICAL ASSIST: SCOOT/BRIDGE, REHAB EVAL
Problem: Discharge Planning  Goal: Discharge to home or other facility with appropriate resources  Outcome: Progressing  Flowsheets (Taken 3/3/2024 0130)  Discharge to home or other facility with appropriate resources:   Identify barriers to discharge with patient and caregiver   Arrange for needed discharge resources and transportation as appropriate   Identify discharge learning needs (meds, wound care, etc)   Refer to discharge planning if patient needs post-hospital services based on physician order or complex needs related to functional status, cognitive ability or social support system     Problem: Pain  Goal: Verbalizes/displays adequate comfort level or baseline comfort level  Outcome: Progressing  Flowsheets (Taken 3/3/2024 0130)  Verbalizes/displays adequate comfort level or baseline comfort level: Assess pain using appropriate pain scale     Problem: ABCDS Injury Assessment  Goal: Absence of physical injury  Outcome: Progressing     Problem: Anxiety  Goal: Will report anxiety at manageable levels  Description: INTERVENTIONS:  1. Administer medication as ordered  2. Teach and rehearse alternative coping skills  3. Provide emotional support with 1:1 interaction with staff  Outcome: Progressing     Problem: Coping  Goal: Pt/Family able to verbalize concerns and demonstrate effective coping strategies  Description: INTERVENTIONS:  1. Assist patient/family to identify coping skills, available support systems and cultural and spiritual values  2. Provide emotional support, including active listening and acknowledgement of concerns of patient and caregivers  3. Reduce environmental stimuli, as able  4. Instruct patient/family in relaxation techniques, as appropriate  5. Assess for spiritual pain/suffering and initiate Spiritual Care, Psychosocial Clinical Specialist consults as needed  Outcome: Progressing     Problem: Depression/Self Harm  Goal: Effect of psychiatric condition will be minimized and patient  will be protected from self harm  Description: INTERVENTIONS:  1. Assess impact of patient's symptoms on level of functioning, self care needs and offer support as indicated  2. Assess patient/family knowledge of depression, impact on illness and need for teaching  3. Provide emotional support, presence and reassurance  4. Assess for possible suicidal thoughts or ideation. If patient expresses suicidal thoughts or statements do not leave alone, initiate Suicide Precautions, move to a room close to the nursing station and obtain sitter  5. Initiate consults as appropriate with Mental Health Professional, Spiritual Care, Psychosocial CNS, and consider a recommendation to the [facility] for a Psychiatric Consultation  Outcome: Progressing     Problem: Drug Abuse/Detox  Goal: Will have no detox symptoms and will verbalize plan for changing drug-related behavior  Description: INTERVENTIONS:  1. Administer medication as ordered  2. Monitor physical status  3. Provide emotional support with 1:1 interaction with staff  4. Encourage  recovery focused treatment   Outcome: Progressing      verbal cues/nonverbal cues (demo/gestures)/1 person assist